# Patient Record
Sex: FEMALE | Race: WHITE | NOT HISPANIC OR LATINO
[De-identification: names, ages, dates, MRNs, and addresses within clinical notes are randomized per-mention and may not be internally consistent; named-entity substitution may affect disease eponyms.]

---

## 2017-06-01 ENCOUNTER — RESULT REVIEW (OUTPATIENT)
Age: 57
End: 2017-06-01

## 2017-08-28 ENCOUNTER — RESULT REVIEW (OUTPATIENT)
Age: 57
End: 2017-08-28

## 2018-04-10 ENCOUNTER — APPOINTMENT (OUTPATIENT)
Dept: OTOLARYNGOLOGY | Facility: CLINIC | Age: 58
End: 2018-04-10
Payer: MEDICAID

## 2018-04-10 VITALS
OXYGEN SATURATION: 97 % | HEIGHT: 65 IN | TEMPERATURE: 97.7 F | DIASTOLIC BLOOD PRESSURE: 70 MMHG | SYSTOLIC BLOOD PRESSURE: 122 MMHG | WEIGHT: 160 LBS | BODY MASS INDEX: 26.66 KG/M2 | HEART RATE: 62 BPM

## 2018-04-10 DIAGNOSIS — Z80.3 FAMILY HISTORY OF MALIGNANT NEOPLASM OF BREAST: ICD-10-CM

## 2018-04-10 DIAGNOSIS — E21.3 HYPERPARATHYROIDISM, UNSPECIFIED: ICD-10-CM

## 2018-04-10 DIAGNOSIS — Z87.09 PERSONAL HISTORY OF OTHER DISEASES OF THE RESPIRATORY SYSTEM: ICD-10-CM

## 2018-04-10 DIAGNOSIS — Z80.0 FAMILY HISTORY OF MALIGNANT NEOPLASM OF DIGESTIVE ORGANS: ICD-10-CM

## 2018-04-10 DIAGNOSIS — Z83.3 FAMILY HISTORY OF DIABETES MELLITUS: ICD-10-CM

## 2018-04-10 DIAGNOSIS — Z86.59 PERSONAL HISTORY OF OTHER MENTAL AND BEHAVIORAL DISORDERS: ICD-10-CM

## 2018-04-10 PROCEDURE — 99204 OFFICE O/P NEW MOD 45 MIN: CPT

## 2018-04-23 ENCOUNTER — APPOINTMENT (OUTPATIENT)
Dept: NUCLEAR MEDICINE | Facility: IMAGING CENTER | Age: 58
End: 2018-04-23
Payer: MEDICAID

## 2018-04-23 ENCOUNTER — OUTPATIENT (OUTPATIENT)
Dept: OUTPATIENT SERVICES | Facility: HOSPITAL | Age: 58
LOS: 1 days | End: 2018-04-23
Payer: COMMERCIAL

## 2018-04-23 DIAGNOSIS — Z00.8 ENCOUNTER FOR OTHER GENERAL EXAMINATION: ICD-10-CM

## 2018-04-23 LAB
24R-OH-CALCIDIOL SERPL-MCNC: 69.8 PG/ML
25(OH)D3 SERPL-MCNC: 28.5 NG/ML
CALCIUM SERPL-MCNC: 11 MG/DL
CALCIUM SERPL-MCNC: 11 MG/DL
PARATHYROID HORMONE INTACT: 85 PG/ML

## 2018-04-23 PROCEDURE — A9500: CPT

## 2018-04-23 PROCEDURE — 78072 PARATHYRD PLANAR W/SPECT&CT: CPT | Mod: 26

## 2018-04-23 PROCEDURE — 78072 PARATHYRD PLANAR W/SPECT&CT: CPT

## 2018-04-23 PROCEDURE — A9512: CPT

## 2018-05-01 ENCOUNTER — APPOINTMENT (OUTPATIENT)
Dept: OTOLARYNGOLOGY | Facility: CLINIC | Age: 58
End: 2018-05-01
Payer: MEDICAID

## 2018-05-01 ENCOUNTER — OUTPATIENT (OUTPATIENT)
Dept: OUTPATIENT SERVICES | Facility: HOSPITAL | Age: 58
LOS: 1 days | End: 2018-05-01
Payer: MEDICAID

## 2018-05-01 VITALS
DIASTOLIC BLOOD PRESSURE: 72 MMHG | BODY MASS INDEX: 26.63 KG/M2 | TEMPERATURE: 98 F | WEIGHT: 160 LBS | SYSTOLIC BLOOD PRESSURE: 130 MMHG | OXYGEN SATURATION: 98 %

## 2018-05-01 PROCEDURE — 99214 OFFICE O/P EST MOD 30 MIN: CPT

## 2018-05-01 PROCEDURE — G9001: CPT

## 2018-05-08 ENCOUNTER — FORM ENCOUNTER (OUTPATIENT)
Age: 58
End: 2018-05-08

## 2018-05-08 ENCOUNTER — APPOINTMENT (OUTPATIENT)
Dept: OTOLARYNGOLOGY | Facility: CLINIC | Age: 58
End: 2018-05-08

## 2018-05-09 ENCOUNTER — OUTPATIENT (OUTPATIENT)
Dept: OUTPATIENT SERVICES | Facility: HOSPITAL | Age: 58
LOS: 1 days | End: 2018-05-09
Payer: COMMERCIAL

## 2018-05-09 ENCOUNTER — APPOINTMENT (OUTPATIENT)
Dept: CT IMAGING | Facility: IMAGING CENTER | Age: 58
End: 2018-05-09
Payer: MEDICAID

## 2018-05-09 DIAGNOSIS — E21.3 HYPERPARATHYROIDISM, UNSPECIFIED: ICD-10-CM

## 2018-05-09 PROCEDURE — 70492 CT SFT TSUE NCK W/O & W/DYE: CPT

## 2018-05-09 PROCEDURE — 70491 CT SOFT TISSUE NECK W/DYE: CPT | Mod: 26

## 2018-05-15 ENCOUNTER — APPOINTMENT (OUTPATIENT)
Dept: OTOLARYNGOLOGY | Facility: CLINIC | Age: 58
End: 2018-05-15
Payer: MEDICAID

## 2018-05-15 VITALS
OXYGEN SATURATION: 97 % | DIASTOLIC BLOOD PRESSURE: 70 MMHG | WEIGHT: 160 LBS | TEMPERATURE: 97.9 F | SYSTOLIC BLOOD PRESSURE: 116 MMHG | HEART RATE: 59 BPM | BODY MASS INDEX: 26.63 KG/M2

## 2018-05-15 PROCEDURE — 31575 DIAGNOSTIC LARYNGOSCOPY: CPT

## 2018-05-15 PROCEDURE — 99214 OFFICE O/P EST MOD 30 MIN: CPT | Mod: 25

## 2018-05-16 ENCOUNTER — OUTPATIENT (OUTPATIENT)
Dept: OUTPATIENT SERVICES | Facility: HOSPITAL | Age: 58
LOS: 1 days | End: 2018-05-16

## 2018-05-16 VITALS
RESPIRATION RATE: 16 BRPM | HEIGHT: 65 IN | DIASTOLIC BLOOD PRESSURE: 80 MMHG | WEIGHT: 160.06 LBS | SYSTOLIC BLOOD PRESSURE: 130 MMHG | TEMPERATURE: 98 F | HEART RATE: 52 BPM

## 2018-05-16 DIAGNOSIS — E21.3 HYPERPARATHYROIDISM, UNSPECIFIED: ICD-10-CM

## 2018-05-16 DIAGNOSIS — F11.10 OPIOID ABUSE, UNCOMPLICATED: ICD-10-CM

## 2018-05-16 DIAGNOSIS — E03.9 HYPOTHYROIDISM, UNSPECIFIED: ICD-10-CM

## 2018-05-16 DIAGNOSIS — E78.5 HYPERLIPIDEMIA, UNSPECIFIED: ICD-10-CM

## 2018-05-16 DIAGNOSIS — N83.8 OTHER NONINFLAMMATORY DISORDERS OF OVARY, FALLOPIAN TUBE AND BROAD LIGAMENT: Chronic | ICD-10-CM

## 2018-05-16 DIAGNOSIS — F41.9 ANXIETY DISORDER, UNSPECIFIED: ICD-10-CM

## 2018-05-16 DIAGNOSIS — Z90.89 ACQUIRED ABSENCE OF OTHER ORGANS: Chronic | ICD-10-CM

## 2018-05-16 LAB
BUN SERPL-MCNC: 15 MG/DL — SIGNIFICANT CHANGE UP (ref 7–23)
CALCIUM SERPL-MCNC: 11 MG/DL — HIGH (ref 8.4–10.5)
CHLORIDE SERPL-SCNC: 97 MMOL/L — LOW (ref 98–107)
CO2 SERPL-SCNC: 28 MMOL/L — SIGNIFICANT CHANGE UP (ref 22–31)
CREAT SERPL-MCNC: 0.72 MG/DL — SIGNIFICANT CHANGE UP (ref 0.5–1.3)
GLUCOSE SERPL-MCNC: 109 MG/DL — HIGH (ref 70–99)
HCT VFR BLD CALC: 40.5 % — SIGNIFICANT CHANGE UP (ref 34.5–45)
HGB BLD-MCNC: 13.3 G/DL — SIGNIFICANT CHANGE UP (ref 11.5–15.5)
MCHC RBC-ENTMCNC: 30.3 PG — SIGNIFICANT CHANGE UP (ref 27–34)
MCHC RBC-ENTMCNC: 32.8 % — SIGNIFICANT CHANGE UP (ref 32–36)
MCV RBC AUTO: 92.3 FL — SIGNIFICANT CHANGE UP (ref 80–100)
NRBC # FLD: 0 — SIGNIFICANT CHANGE UP
PLATELET # BLD AUTO: 244 K/UL — SIGNIFICANT CHANGE UP (ref 150–400)
PMV BLD: 9 FL — SIGNIFICANT CHANGE UP (ref 7–13)
POTASSIUM SERPL-MCNC: 4.4 MMOL/L — SIGNIFICANT CHANGE UP (ref 3.5–5.3)
POTASSIUM SERPL-SCNC: 4.4 MMOL/L — SIGNIFICANT CHANGE UP (ref 3.5–5.3)
RBC # BLD: 4.39 M/UL — SIGNIFICANT CHANGE UP (ref 3.8–5.2)
RBC # FLD: 12.6 % — SIGNIFICANT CHANGE UP (ref 10.3–14.5)
SODIUM SERPL-SCNC: 138 MMOL/L — SIGNIFICANT CHANGE UP (ref 135–145)
WBC # BLD: 8.07 K/UL — SIGNIFICANT CHANGE UP (ref 3.8–10.5)
WBC # FLD AUTO: 8.07 K/UL — SIGNIFICANT CHANGE UP (ref 3.8–10.5)

## 2018-05-16 NOTE — H&P PST ADULT - PMH
Anxiety and depression    Back pain    Hyperlipidemia    Hyperparathyroidism    Hypothyroidism    Opioid abuse  Oxycodone  used [id number], taking suboxone for 5 years  Osteoarthritis    Osteopenia

## 2018-05-16 NOTE — H&P PST ADULT - HISTORY OF PRESENT ILLNESS
57 yo female with PMH hypothyroidism, osteoarthritis, hyperlipidemia, anxiety, depression, back pain, and opoid addiction on Suboxone presents to pre surgical testing. Pt reports she has been diagnosed with serum elevated Calcium level for about 10 years. Pt reports she is starting to develop osteopenia. Pt reports multiple neck sonograms done.  Pt reports CT done 1 week ago.  Pt is scheduled for right parathyroidectomy with parathyroid hormone assay on 5/23/18. 57 yo female with PMH hypothyroidism, osteoarthritis, hyperlipidemia, anxiety, depression, back pain, and opoid addiction on Suboxone presents to pre surgical testing. Pt reports she has been diagnosed with elevated Calcium level for about 10 years. Pt reports she is starting to develop osteopenia. Pt reports multiple neck sonograms done.  Pt reports CT done 1 week ago.  Pt is scheduled for right parathyroidectomy with parathyroid hormone assay on 5/23/18.

## 2018-05-16 NOTE — H&P PST ADULT - NEGATIVE ENMT SYMPTOMS
no nasal discharge/no sinus symptoms/no dysphagia/no vertigo/no nasal congestion/no ear pain/no tinnitus

## 2018-05-16 NOTE — H&P PST ADULT - NEGATIVE MUSCULOSKELETAL SYMPTOMS
no myalgia/no stiffness/no joint swelling/no muscle weakness/no leg pain L/no arthralgia/no muscle cramps/no arm pain R/no leg pain R/no arm pain L

## 2018-05-16 NOTE — H&P PST ADULT - NEGATIVE CARDIOVASCULAR SYMPTOMS
no palpitations/no peripheral edema/no paroxysmal nocturnal dyspnea/no orthopnea/no claudication/no dyspnea on exertion/no chest pain

## 2018-05-16 NOTE — H&P PST ADULT - FAMILY HISTORY
Mother  Still living? Yes, Estimated age: Age Unknown  Family history of breast cancer, Age at diagnosis: Age Unknown  Family history of thyroid cancer, Age at diagnosis: Age Unknown     Father  Still living? No  Family history of colon cancer, Age at diagnosis: Age Unknown  Family history of diabetes mellitus, Age at diagnosis: Age Unknown     Sibling  Still living? Yes, Estimated age: Age Unknown  Family history of thyroid cancer, Age at diagnosis: Age Unknown

## 2018-05-16 NOTE — H&P PST ADULT - NEGATIVE NEUROLOGICAL SYMPTOMS
no vertigo/no loss of sensation/no paresthesias/no tremors/no transient paralysis/no weakness/no generalized seizures/no difficulty walking

## 2018-05-16 NOTE — H&P PST ADULT - PROBLEM SELECTOR PLAN 1
Pt is scheduled for right parathyroidectomy with parathyroid hormone assay on 5/23/18.   Pre op instructions including chlorhexidine wash given and pt able to verbalize understanding.

## 2018-05-16 NOTE — H&P PST ADULT - PROBLEM SELECTOR PLAN 5
Spoke with Dr. Mccarthy(psychiatrist-prescriber for Suboxone) over phone, it is Dr. Mccarthy's recommendation to hold Suboxone 3 days preop, last dose 5/19.  Pt able to verbalize instructions.

## 2018-05-21 DIAGNOSIS — R69 ILLNESS, UNSPECIFIED: ICD-10-CM

## 2018-05-22 ENCOUNTER — TRANSCRIPTION ENCOUNTER (OUTPATIENT)
Age: 58
End: 2018-05-22

## 2018-05-22 NOTE — ASU PATIENT PROFILE, ADULT - PMH
Anxiety and depression    Back pain    Hyperlipidemia    Hyperparathyroidism    Hypothyroidism    Opioid abuse  Oxycodone  used [id number], taking suboxone for 5 years  Osteoarthritis    Osteopenia

## 2018-05-23 ENCOUNTER — OUTPATIENT (OUTPATIENT)
Dept: OUTPATIENT SERVICES | Facility: HOSPITAL | Age: 58
LOS: 1 days | Discharge: HOME | End: 2018-05-23
Payer: MEDICAID

## 2018-05-23 ENCOUNTER — RESULT REVIEW (OUTPATIENT)
Age: 58
End: 2018-05-23

## 2018-05-23 ENCOUNTER — APPOINTMENT (OUTPATIENT)
Dept: OTOLARYNGOLOGY | Facility: HOSPITAL | Age: 58
End: 2018-05-23

## 2018-05-23 VITALS
RESPIRATION RATE: 16 BRPM | SYSTOLIC BLOOD PRESSURE: 138 MMHG | TEMPERATURE: 98 F | OXYGEN SATURATION: 97 % | WEIGHT: 160.06 LBS | HEART RATE: 53 BPM | DIASTOLIC BLOOD PRESSURE: 78 MMHG | HEIGHT: 65 IN

## 2018-05-23 VITALS
DIASTOLIC BLOOD PRESSURE: 87 MMHG | SYSTOLIC BLOOD PRESSURE: 149 MMHG | RESPIRATION RATE: 14 BRPM | HEART RATE: 69 BPM | OXYGEN SATURATION: 100 %

## 2018-05-23 DIAGNOSIS — Z90.89 ACQUIRED ABSENCE OF OTHER ORGANS: Chronic | ICD-10-CM

## 2018-05-23 DIAGNOSIS — N83.8 OTHER NONINFLAMMATORY DISORDERS OF OVARY, FALLOPIAN TUBE AND BROAD LIGAMENT: Chronic | ICD-10-CM

## 2018-05-23 DIAGNOSIS — E21.3 HYPERPARATHYROIDISM, UNSPECIFIED: ICD-10-CM

## 2018-05-23 LAB — PTH-INTACT SERPL-MCNC: 12.51 PG/ML — LOW (ref 15–65)

## 2018-05-23 PROCEDURE — 88305 TISSUE EXAM BY PATHOLOGIST: CPT | Mod: 26

## 2018-05-23 PROCEDURE — 60500 EXPLORE PARATHYROID GLANDS: CPT

## 2018-05-23 PROCEDURE — 88331 PATH CONSLTJ SURG 1 BLK 1SPC: CPT | Mod: 26

## 2018-05-23 RX ORDER — IBUPROFEN 200 MG
1 TABLET ORAL
Qty: 0 | Refills: 0 | COMMUNITY

## 2018-05-23 RX ORDER — CALCIUM CARBONATE 500(1250)
3 TABLET ORAL
Qty: 270 | Refills: 0
Start: 2018-05-23 | End: 2018-06-21

## 2018-05-23 RX ORDER — CALCITRIOL 0.5 UG/1
1 CAPSULE ORAL
Qty: 60 | Refills: 0
Start: 2018-05-23 | End: 2018-06-21

## 2018-05-23 NOTE — ASU DISCHARGE PLAN (ADULT/PEDIATRIC). - MEDICATION SUMMARY - MEDICATIONS TO STOP TAKING
I will STOP taking the medications listed below when I get home from the hospital:    Advil 200 mg oral tablet  -- 1 tab(s) by mouth every 6 hours. Last dose 5/16/18

## 2018-05-23 NOTE — ASU DISCHARGE PLAN (ADULT/PEDIATRIC). - NOTIFY
Pain not relieved by Medications/Persistent Nausea and Vomiting/Unable to Urinate/Bleeding that does not stop/Numbness, tingling/Swelling that continues/Fever greater than 101

## 2018-05-23 NOTE — ASU DISCHARGE PLAN (ADULT/PEDIATRIC). - MEDICATION SUMMARY - MEDICATIONS TO TAKE
I will START or STAY ON the medications listed below when I get home from the hospital:    Suboxone 4 mg-1 mg sublingual film  -- 1 film(s) under tongue once a day AM  -- Indication: For Home    PROzac 40 mg oral capsule  -- 1 cap(s) by mouth once a day  -- Indication: For Home    pravastatin 10 mg oral tablet  -- 1 tab(s) by mouth once a day  -- Indication: For Home    Fish Oil oral capsule  -- 1 cap(s) by mouth once a day. last dose 5/15/18  -- Indication: For Home    CoQ10 300 mg oral capsule  -- 1 cap(s) by mouth once a day. Last dose 5/15/18  -- Indication: For Home    levothyroxine 88 mcg (0.088 mg) oral tablet  -- 1 tab(s) by mouth once a day  -- Indication: For Home I will START or STAY ON the medications listed below when I get home from the hospital:    Percocet 5/325 oral tablet  -- 1 tab(s) by mouth every 6 hours, As Needed MDD:4-6 for pain  -- Caution federal law prohibits the transfer of this drug to any person other  than the person for whom it was prescribed.  May cause drowsiness.  Alcohol may intensify this effect.  Use care when operating dangerous machinery.  This prescription cannot be refilled.  This product contains acetaminophen.  Do not use  with any other product containing acetaminophen to prevent possible liver damage.  Using more of this medication than prescribed may cause serious breathing problems.    -- Indication: For pain med    Suboxone 4 mg-1 mg sublingual film  -- 1 film(s) under tongue once a day AM  -- Indication: For Home    PROzac 40 mg oral capsule  -- 1 cap(s) by mouth once a day  -- Indication: For Home    pravastatin 10 mg oral tablet  -- 1 tab(s) by mouth once a day  -- Indication: For Home    Fish Oil oral capsule  -- 1 cap(s) by mouth once a day. last dose 5/15/18  -- Indication: For Home    CoQ10 300 mg oral capsule  -- 1 cap(s) by mouth once a day. Last dose 5/15/18  -- Indication: For Home    levothyroxine 88 mcg (0.088 mg) oral tablet  -- 1 tab(s) by mouth once a day  -- Indication: For Home I will START or STAY ON the medications listed below when I get home from the hospital:    Percocet 5/325 oral tablet  -- 1 tab(s) by mouth every 6 hours, As Needed MDD:4-6 for pain  -- Caution federal law prohibits the transfer of this drug to any person other  than the person for whom it was prescribed.  May cause drowsiness.  Alcohol may intensify this effect.  Use care when operating dangerous machinery.  This prescription cannot be refilled.  This product contains acetaminophen.  Do not use  with any other product containing acetaminophen to prevent possible liver damage.  Using more of this medication than prescribed may cause serious breathing problems.    -- Indication: For pain    Suboxone 4 mg-1 mg sublingual film  -- 1 film(s) under tongue once a day AM  -- Indication: For Home    PROzac 40 mg oral capsule  -- 1 cap(s) by mouth once a day  -- Indication: For Home    pravastatin 10 mg oral tablet  -- 1 tab(s) by mouth once a day  -- Indication: For Home    Fish Oil oral capsule  -- 1 cap(s) by mouth once a day. last dose 5/15/18  -- Indication: For Home    CoQ10 300 mg oral capsule  -- 1 cap(s) by mouth once a day. Last dose 5/15/18  -- Indication: For Home    levothyroxine 88 mcg (0.088 mg) oral tablet  -- 1 tab(s) by mouth once a day  -- Indication: For Home I will START or STAY ON the medications listed below when I get home from the hospital:    Percocet 5/325 oral tablet  -- 1 tab(s) by mouth every 6 hours, As Needed MDD:4-6 for pain  -- Caution federal law prohibits the transfer of this drug to any person other  than the person for whom it was prescribed.  May cause drowsiness.  Alcohol may intensify this effect.  Use care when operating dangerous machinery.  This prescription cannot be refilled.  This product contains acetaminophen.  Do not use  with any other product containing acetaminophen to prevent possible liver damage.  Using more of this medication than prescribed may cause serious breathing problems.    -- Indication: For pain    Suboxone 4 mg-1 mg sublingual film  -- 1 film(s) under tongue once a day AM  -- Indication: For Home    calcium carbonate 500 mg (200 mg elemental calcium) oral tablet, chewable  -- 3 tab(s) chewed 3 times a day   -- Chew tablets before swallowing    -- Indication: For calcium supplements    PROzac 40 mg oral capsule  -- 1 cap(s) by mouth once a day  -- Indication: For Home    pravastatin 10 mg oral tablet  -- 1 tab(s) by mouth once a day  -- Indication: For Home    Fish Oil oral capsule  -- 1 cap(s) by mouth once a day. last dose 5/15/18  -- Indication: For Home    CoQ10 300 mg oral capsule  -- 1 cap(s) by mouth once a day. Last dose 5/15/18  -- Indication: For Home    levothyroxine 88 mcg (0.088 mg) oral tablet  -- 1 tab(s) by mouth once a day  -- Indication: For Home    calcitriol 0.5 mcg oral capsule  -- 1 cap(s) by mouth 2 times a day   -- It is very important that you take or use this exactly as directed.  Do not skip doses or discontinue unless directed by your doctor.  Obtain medical advice before taking any non-prescription drugs as some may affect the action of this medication.    -- Indication: For calcium supplements

## 2018-06-07 ENCOUNTER — APPOINTMENT (OUTPATIENT)
Dept: OTOLARYNGOLOGY | Facility: CLINIC | Age: 58
End: 2018-06-07
Payer: MEDICAID

## 2018-06-07 VITALS
BODY MASS INDEX: 26.66 KG/M2 | DIASTOLIC BLOOD PRESSURE: 120 MMHG | WEIGHT: 160 LBS | HEART RATE: 75 BPM | HEIGHT: 65 IN | SYSTOLIC BLOOD PRESSURE: 153 MMHG

## 2018-06-07 PROCEDURE — 99024 POSTOP FOLLOW-UP VISIT: CPT

## 2018-06-07 RX ORDER — B-COMPLEX WITH VITAMIN C
1250 (500 CA) TABLET ORAL
Qty: 90 | Refills: 3 | Status: ACTIVE | COMMUNITY
Start: 2018-06-07 | End: 1900-01-01

## 2018-06-15 LAB
24R-OH-CALCIDIOL SERPL-MCNC: 54.2 PG/ML
CALCIUM SERPL-MCNC: 9.9 MG/DL
CALCIUM SERPL-MCNC: 9.9 MG/DL
PARATHYROID HORMONE INTACT: 18 PG/ML

## 2018-09-20 ENCOUNTER — APPOINTMENT (OUTPATIENT)
Dept: OTOLARYNGOLOGY | Facility: CLINIC | Age: 58
End: 2018-09-20

## 2019-07-29 ENCOUNTER — RESULT REVIEW (OUTPATIENT)
Age: 59
End: 2019-07-29

## 2019-08-09 PROBLEM — M19.90 UNSPECIFIED OSTEOARTHRITIS, UNSPECIFIED SITE: Chronic | Status: ACTIVE | Noted: 2018-05-16

## 2019-08-09 PROBLEM — M54.9 DORSALGIA, UNSPECIFIED: Chronic | Status: ACTIVE | Noted: 2018-05-16

## 2019-08-09 PROBLEM — E21.3 HYPERPARATHYROIDISM, UNSPECIFIED: Chronic | Status: ACTIVE | Noted: 2018-05-16

## 2019-08-09 PROBLEM — E78.5 HYPERLIPIDEMIA, UNSPECIFIED: Chronic | Status: ACTIVE | Noted: 2018-05-16

## 2019-08-09 PROBLEM — E03.9 HYPOTHYROIDISM, UNSPECIFIED: Chronic | Status: ACTIVE | Noted: 2018-05-16

## 2019-08-09 PROBLEM — F11.10 OPIOID ABUSE, UNCOMPLICATED: Chronic | Status: ACTIVE | Noted: 2018-05-16

## 2019-08-09 PROBLEM — M85.80 OTHER SPECIFIED DISORDERS OF BONE DENSITY AND STRUCTURE, UNSPECIFIED SITE: Chronic | Status: ACTIVE | Noted: 2018-05-16

## 2019-08-09 PROBLEM — F41.9 ANXIETY DISORDER, UNSPECIFIED: Chronic | Status: ACTIVE | Noted: 2018-05-16

## 2019-08-15 ENCOUNTER — APPOINTMENT (OUTPATIENT)
Dept: SURGERY | Facility: CLINIC | Age: 59
End: 2019-08-15
Payer: MEDICAID

## 2019-08-15 VITALS
HEIGHT: 65 IN | WEIGHT: 168 LBS | BODY MASS INDEX: 27.99 KG/M2 | TEMPERATURE: 98.2 F | DIASTOLIC BLOOD PRESSURE: 84 MMHG | HEART RATE: 71 BPM | SYSTOLIC BLOOD PRESSURE: 145 MMHG

## 2019-08-15 DIAGNOSIS — D05.12 INTRADUCTAL CARCINOMA IN SITU OF LEFT BREAST: ICD-10-CM

## 2019-08-15 DIAGNOSIS — Z87.09 PERSONAL HISTORY OF OTHER DISEASES OF THE RESPIRATORY SYSTEM: ICD-10-CM

## 2019-08-15 DIAGNOSIS — Z87.891 PERSONAL HISTORY OF NICOTINE DEPENDENCE: ICD-10-CM

## 2019-08-15 DIAGNOSIS — Z80.8 FAMILY HISTORY OF MALIGNANT NEOPLASM OF OTHER ORGANS OR SYSTEMS: ICD-10-CM

## 2019-08-15 DIAGNOSIS — Z86.59 PERSONAL HISTORY OF OTHER MENTAL AND BEHAVIORAL DISORDERS: ICD-10-CM

## 2019-08-15 DIAGNOSIS — Z85.3 PERSONAL HISTORY OF MALIGNANT NEOPLASM OF BREAST: ICD-10-CM

## 2019-08-15 DIAGNOSIS — Z87.39 PERSONAL HISTORY OF OTHER DISEASES OF THE MUSCULOSKELETAL SYSTEM AND CONNECTIVE TISSUE: ICD-10-CM

## 2019-08-15 DIAGNOSIS — K70.30 ALCOHOLIC CIRRHOSIS OF LIVER W/OUT ASCITES: ICD-10-CM

## 2019-08-15 DIAGNOSIS — Z80.3 FAMILY HISTORY OF MALIGNANT NEOPLASM OF BREAST: ICD-10-CM

## 2019-08-15 DIAGNOSIS — Z86.39 PERSONAL HISTORY OF OTHER ENDOCRINE, NUTRITIONAL AND METABOLIC DISEASE: ICD-10-CM

## 2019-08-15 DIAGNOSIS — Z82.49 FAMILY HISTORY OF ISCHEMIC HEART DISEASE AND OTHER DISEASES OF THE CIRCULATORY SYSTEM: ICD-10-CM

## 2019-08-15 PROCEDURE — 99204 OFFICE O/P NEW MOD 45 MIN: CPT

## 2019-08-15 RX ORDER — LEVOTHYROXINE SODIUM 88 UG/1
88 TABLET ORAL
Refills: 0 | Status: ACTIVE | COMMUNITY

## 2019-08-15 RX ORDER — PRAVASTATIN SODIUM 80 MG/1
TABLET ORAL
Refills: 0 | Status: ACTIVE | COMMUNITY

## 2019-08-15 RX ORDER — FLUOXETINE HCL 10 MG
TABLET ORAL
Refills: 0 | Status: ACTIVE | COMMUNITY

## 2019-08-15 RX ORDER — BUPRENORPHINE HCL/NALOXONE HCL 8 MG-2 MG
8-2 TABLET, SUBLINGUAL SUBLINGUAL
Refills: 0 | Status: ACTIVE | COMMUNITY

## 2019-08-15 NOTE — ASSESSMENT
[FreeTextEntry1] : 59 y.o F with recently diagnosed L. breast CA, she has DCIS with microcalcifications present, grade 2 atypia.

## 2019-08-15 NOTE — PHYSICAL EXAM
[Normal Breath Sounds] : Normal breath sounds [Normal Rate and Rhythm] : normal rate and rhythm [No Rash or Lesion] : No rash or lesion [Alert] : alert [Oriented to Person] : oriented to person [Oriented to Place] : oriented to place [Oriented to Time] : oriented to time [Calm] : calm [de-identified] : A/Ox3; NAD [de-identified] : EOMI, sclera anicteric  [de-identified] : post biopsy changes noted to L. breast, 4:00 near nipple areolar complex; no masses or lumps felt to either breast, no nipple discharge, no skin thickening, no chest deformity, no axillary adenopathy.  [de-identified] : supple, no JVD [de-identified] : abd is soft, NT/ND [de-identified] : no LE edema

## 2019-08-15 NOTE — CONSULT LETTER
[Dear  ___] : Dear  [unfilled], [Consult Letter:] : I had the pleasure of evaluating your patient, [unfilled]. [Consult Closing:] : Thank you very much for allowing me to participate in the care of this patient.  If you have any questions, please do not hesitate to contact me. [Sincerely,] : Sincerely, [FreeTextEntry3] : Jim Parham MD\par

## 2019-08-15 NOTE — HISTORY OF PRESENT ILLNESS
[de-identified] : Patient is a 59 y.o F referred for consultation visit, she has L. breast CA. Patient had a screening mammogram, 05/25/19: Results showed anterior third of the left lower inner breast, approximately 2 to 3 cm from the nipple, grouped calcifications, increasing in number over time. There are stable diffuse right breast calcifications. Unilateral call back L. breast mammo and b/l breast US was done 06/19/19;  group of suspicious microcalcifications seen at about 6:00 left breast. BI RADS cat 4 finding. \par L. Breast Stereo Core guided BX, 07/29/19; Findings were consistent with ductal carcinoma in situ as well as fibrocystic changes. The patient is ER/VT positive. \par Patient had parathyroid removed 2/2 CA; has hx of ectopic pregnancy. She denies any personal hx of breast CA, no previous breast biopsies. \par \par Fam HX: Brother- Thyroid CA, had thyroidectomy\par Mother- Breast CA (Age 60s) ; Thyroid CA

## 2019-08-15 NOTE — REVIEW OF SYSTEMS
[Fever] : no fever [Chills] : no chills [Sore Throat] : no sore throat [Chest Pain] : no chest pain [Lower Ext Edema] : no lower extremity edema [Shortness Of Breath] : no shortness of breath [Wheezing] : no wheezing [Abdominal Pain] : no abdominal pain [Pelvic Pain] : no pelvic pain [Joint Swelling] : no joint swelling [Joint Stiffness] : no joint stiffness [Skin Lesions] : no skin lesions [Skin Wound] : no skin wound [Dizziness] : no dizziness [Anxiety] : no anxiety [Muscle Weakness] : no muscle weakness [Swollen Glands] : no swollen glands [de-identified] : denies feeling any breast pain or lumps to either side

## 2019-08-15 NOTE — PLAN
[FreeTextEntry1] : Had  a long d/w the patient. She has newly diagnosed breast cancer- DCIS, Left Breast. Preoperative needle localization and Lumpectomy followed by radiation therapy versus mastectomy with or without reconstruction were discussed. The potential complications including infection, bleeding, upper extremity swelling, recurrence and other complications were also discussed. All the questions were answered to her satisfaction. \par She would need needle localization and excision of the L. breast lesion. All the options, benefits and risks were discussed. The small potential for infection, bleeding and not getting the clip were discussed. Will schedule at [facility]. \par The potential for invasive breast cancer was also discussed as well as the margins which may results in further surgery\par \par

## 2019-08-27 ENCOUNTER — OUTPATIENT (OUTPATIENT)
Dept: OUTPATIENT SERVICES | Facility: HOSPITAL | Age: 59
LOS: 1 days | End: 2019-08-27
Payer: MEDICAID

## 2019-08-27 VITALS
HEART RATE: 59 BPM | WEIGHT: 164.91 LBS | HEIGHT: 65 IN | DIASTOLIC BLOOD PRESSURE: 89 MMHG | OXYGEN SATURATION: 100 % | RESPIRATION RATE: 18 BRPM | TEMPERATURE: 97 F | SYSTOLIC BLOOD PRESSURE: 138 MMHG

## 2019-08-27 DIAGNOSIS — Z01.818 ENCOUNTER FOR OTHER PREPROCEDURAL EXAMINATION: ICD-10-CM

## 2019-08-27 DIAGNOSIS — N83.8 OTHER NONINFLAMMATORY DISORDERS OF OVARY, FALLOPIAN TUBE AND BROAD LIGAMENT: Chronic | ICD-10-CM

## 2019-08-27 DIAGNOSIS — D05.12 INTRADUCTAL CARCINOMA IN SITU OF LEFT BREAST: ICD-10-CM

## 2019-08-27 DIAGNOSIS — Z90.89 ACQUIRED ABSENCE OF OTHER ORGANS: Chronic | ICD-10-CM

## 2019-08-27 PROCEDURE — G0463: CPT

## 2019-08-27 RX ORDER — FLUOXETINE HCL 10 MG
1 CAPSULE ORAL
Qty: 0 | Refills: 0 | DISCHARGE

## 2019-08-27 RX ORDER — OMEGA-3 ACID ETHYL ESTERS 1 G
1 CAPSULE ORAL
Qty: 0 | Refills: 0 | DISCHARGE

## 2019-08-27 RX ORDER — UBIDECARENONE 100 MG
1 CAPSULE ORAL
Qty: 0 | Refills: 0 | DISCHARGE

## 2019-08-27 RX ORDER — LEVOTHYROXINE SODIUM 125 MCG
1 TABLET ORAL
Qty: 0 | Refills: 0 | DISCHARGE

## 2019-08-27 RX ORDER — BUPRENORPHINE AND NALOXONE 2; .5 MG/1; MG/1
1 TABLET SUBLINGUAL
Qty: 0 | Refills: 0 | DISCHARGE

## 2019-08-27 NOTE — H&P PST ADULT - NSICDXPASTMEDICALHX_GEN_ALL_CORE_FT
PAST MEDICAL HISTORY:  Anxiety and depression     Back pain     Hyperlipidemia     Hyperparathyroidism     Hypothyroidism     Opioid abuse Oxycodone  used [id number], taking suboxone for 5 years    Osteoarthritis     Osteopenia PAST MEDICAL HISTORY:  Anxiety and depression     Back pain     Hyperlipidemia     Hyperparathyroidism     Hypothyroidism     Intraductal carcinoma of left breast     Opioid abuse Oxycodone  used [id number], taking suboxone for 5 years    Osteoarthritis     Osteopenia PAST MEDICAL HISTORY:  Anxiety and depression     Back pain     Chronic obstructive pulmonary disease (COPD) no inhalers    Hepatitis C treated with Interferon 10 years ago    Hyperlipidemia     Hyperparathyroidism     Hypothyroidism     Intraductal carcinoma of left breast     Opioid abuse Oxycodone  used [id number], taking suboxone for 5 years    Osteoarthritis     Osteopenia

## 2019-08-27 NOTE — H&P PST ADULT - ASSESSMENT
59 yr old female with PMH of anxiety, depression, hypothyroidism, opioid dependence on Buprenorphine-naloxone sublingual daily, COPD-no inhalers, hepatitis C treated 10 years ago presents with intraductal carcinoma of left breast. Pt is scheduled for left breast lumpectomy with preoperative needle localization on 9/4/2019.

## 2019-08-27 NOTE — H&P PST ADULT - NEGATIVE BREAST SYMPTOMS
no nipple discharge R/no breast lump R/no nipple discharge L/no breast tenderness L/no breast tenderness R

## 2019-08-27 NOTE — H&P PST ADULT - NSICDXPROBLEM_GEN_ALL_CORE_FT
PROBLEM DIAGNOSES  Problem: Opioid dependence  Assessment and Plan: Pt on Buprenorphine-naloxone due to opioid dependence. Discussed with Dr Mcleod. Instructed pt to stop meds 3 days before surgery as per Dr Mcleod. Stated understanding.    Problem: Anxiety and depression  Assessment and Plan: Continue meds and take Prozac with sips of water on day of surgery. Follow-up with Provider for management.    Problem: Hypothyroidism  Assessment and Plan: Continue Levothyroxine and take with sips of water on day of surgery. Follow-up with PCP postop for thyroid management.

## 2019-08-27 NOTE — H&P PST ADULT - NEGATIVE GASTROINTESTINAL SYMPTOMS
no nausea/no change in bowel habits/no melena/no constipation/no abdominal pain/no diarrhea/no flatulence/no vomiting

## 2019-08-27 NOTE — H&P PST ADULT - HISTORY OF PRESENT ILLNESS
59 yr old female with PMH of anxiety, depression, hypothyroidism, opioid dependence on Buprenorphine-naloxone sublingual daily, COPD-no inhalers, hepatitis C treated 10 years ago presents with c/o abnormal mammogram. Biopsy revealed intraductal carcinoma. Pt for left breast lumpectomy with preoperative needle localization on 9/4/2019.

## 2019-08-27 NOTE — H&P PST ADULT - NSICDXPASTSURGICALHX_GEN_ALL_CORE_FT
PAST SURGICAL HISTORY:  Rupture, fallopian tube left, 1983    S/P tonsillectomy 1960s PAST SURGICAL HISTORY:  H/O left breast biopsy stereotactic on 7/29/2019    History of parathyroidectomy right inferior on 5/14/2018    Rupture, fallopian tube left, 1983    S/P tonsillectomy 1960s

## 2019-08-27 NOTE — H&P PST ADULT - NSICDXFAMILYHX_GEN_ALL_CORE_FT
FAMILY HISTORY:  Father  Still living? No  Family history of colon cancer, Age at diagnosis: Age Unknown  Family history of diabetes mellitus, Age at diagnosis: Age Unknown    Mother  Still living? Yes, Estimated age: Age Unknown  Family history of breast cancer, Age at diagnosis: Age Unknown  Family history of thyroid cancer, Age at diagnosis: Age Unknown    Sibling  Still living? Yes, Estimated age: Age Unknown  Family history of thyroid cancer, Age at diagnosis: Age Unknown FAMILY HISTORY:  Family history of brain cancer, grandmother  Family history of lung cancer, maternal grandmother  Family history of rectal cancer, father  Family history of skin cancer, father    Father  Still living? No  Family history of colon cancer, Age at diagnosis: Age Unknown  Family history of diabetes mellitus, Age at diagnosis: Age Unknown    Mother  Still living? Yes, Estimated age: Age Unknown  Family history of breast cancer, Age at diagnosis: Age Unknown  Family history of thyroid cancer, Age at diagnosis: Age Unknown    Sibling  Still living? Yes, Estimated age: Age Unknown  Family history of thyroid cancer, Age at diagnosis: Age Unknown

## 2019-08-30 DIAGNOSIS — F41.9 ANXIETY DISORDER, UNSPECIFIED: ICD-10-CM

## 2019-08-30 DIAGNOSIS — Z29.9 ENCOUNTER FOR PROPHYLACTIC MEASURES, UNSPECIFIED: ICD-10-CM

## 2019-08-30 DIAGNOSIS — E03.9 HYPOTHYROIDISM, UNSPECIFIED: ICD-10-CM

## 2019-08-30 DIAGNOSIS — F11.20 OPIOID DEPENDENCE, UNCOMPLICATED: ICD-10-CM

## 2019-08-30 RX ORDER — SODIUM CHLORIDE 9 MG/ML
3 INJECTION INTRAMUSCULAR; INTRAVENOUS; SUBCUTANEOUS EVERY 8 HOURS
Refills: 0 | Status: DISCONTINUED | OUTPATIENT
Start: 2019-09-04 | End: 2019-09-14

## 2019-09-03 ENCOUNTER — FORM ENCOUNTER (OUTPATIENT)
Age: 59
End: 2019-09-03

## 2019-09-04 ENCOUNTER — APPOINTMENT (OUTPATIENT)
Dept: SURGERY | Facility: HOSPITAL | Age: 59
End: 2019-09-04

## 2019-09-04 ENCOUNTER — OUTPATIENT (OUTPATIENT)
Dept: OUTPATIENT SERVICES | Facility: HOSPITAL | Age: 59
LOS: 1 days | End: 2019-09-04
Payer: MEDICAID

## 2019-09-04 ENCOUNTER — RESULT REVIEW (OUTPATIENT)
Age: 59
End: 2019-09-04

## 2019-09-04 VITALS
SYSTOLIC BLOOD PRESSURE: 132 MMHG | DIASTOLIC BLOOD PRESSURE: 67 MMHG | HEART RATE: 69 BPM | WEIGHT: 164.91 LBS | OXYGEN SATURATION: 97 % | RESPIRATION RATE: 16 BRPM | HEIGHT: 65 IN | TEMPERATURE: 97 F

## 2019-09-04 VITALS
HEART RATE: 64 BPM | OXYGEN SATURATION: 95 % | RESPIRATION RATE: 16 BRPM | TEMPERATURE: 98 F | SYSTOLIC BLOOD PRESSURE: 123 MMHG | DIASTOLIC BLOOD PRESSURE: 73 MMHG

## 2019-09-04 DIAGNOSIS — N83.8 OTHER NONINFLAMMATORY DISORDERS OF OVARY, FALLOPIAN TUBE AND BROAD LIGAMENT: Chronic | ICD-10-CM

## 2019-09-04 DIAGNOSIS — J44.9 CHRONIC OBSTRUCTIVE PULMONARY DISEASE, UNSPECIFIED: ICD-10-CM

## 2019-09-04 DIAGNOSIS — Z90.09 ACQUIRED ABSENCE OF OTHER PART OF HEAD AND NECK: Chronic | ICD-10-CM

## 2019-09-04 DIAGNOSIS — Z98.890 OTHER SPECIFIED POSTPROCEDURAL STATES: Chronic | ICD-10-CM

## 2019-09-04 DIAGNOSIS — Z90.89 ACQUIRED ABSENCE OF OTHER ORGANS: Chronic | ICD-10-CM

## 2019-09-04 DIAGNOSIS — D05.12 INTRADUCTAL CARCINOMA IN SITU OF LEFT BREAST: ICD-10-CM

## 2019-09-04 PROCEDURE — 88307 TISSUE EXAM BY PATHOLOGIST: CPT | Mod: 26

## 2019-09-04 PROCEDURE — 88307 TISSUE EXAM BY PATHOLOGIST: CPT

## 2019-09-04 PROCEDURE — 19281 PERQ DEVICE BREAST 1ST IMAG: CPT | Mod: LT

## 2019-09-04 PROCEDURE — 19301 PARTIAL MASTECTOMY: CPT | Mod: LT

## 2019-09-04 PROCEDURE — 76098 X-RAY EXAM SURGICAL SPECIMEN: CPT | Mod: 26

## 2019-09-04 PROCEDURE — 19281 PERQ DEVICE BREAST 1ST IMAG: CPT

## 2019-09-04 PROCEDURE — 76098 X-RAY EXAM SURGICAL SPECIMEN: CPT

## 2019-09-04 RX ORDER — ACETAMINOPHEN 500 MG
650 TABLET ORAL EVERY 6 HOURS
Refills: 0 | Status: DISCONTINUED | OUTPATIENT
Start: 2019-09-04 | End: 2019-09-14

## 2019-09-04 RX ORDER — SODIUM CHLORIDE 9 MG/ML
1000 INJECTION, SOLUTION INTRAVENOUS
Refills: 0 | Status: DISCONTINUED | OUTPATIENT
Start: 2019-09-04 | End: 2019-09-04

## 2019-09-04 RX ORDER — ACETAMINOPHEN 500 MG
1000 TABLET ORAL ONCE
Refills: 0 | Status: COMPLETED | OUTPATIENT
Start: 2019-09-04 | End: 2019-09-04

## 2019-09-04 RX ORDER — OXYCODONE AND ACETAMINOPHEN 5; 325 MG/1; MG/1
1 TABLET ORAL EVERY 4 HOURS
Refills: 0 | Status: DISCONTINUED | OUTPATIENT
Start: 2019-09-04 | End: 2019-09-04

## 2019-09-04 RX ORDER — ACETAMINOPHEN 500 MG
2 TABLET ORAL
Qty: 0 | Refills: 0 | DISCHARGE

## 2019-09-04 RX ORDER — IBUPROFEN 200 MG
600 TABLET ORAL EVERY 6 HOURS
Refills: 0 | Status: DISCONTINUED | OUTPATIENT
Start: 2019-09-04 | End: 2019-09-14

## 2019-09-04 RX ADMIN — Medication 400 MILLIGRAM(S): at 14:35

## 2019-09-04 RX ADMIN — Medication 1000 MILLIGRAM(S): at 14:50

## 2019-09-04 RX ADMIN — SODIUM CHLORIDE 3 MILLILITER(S): 9 INJECTION INTRAMUSCULAR; INTRAVENOUS; SUBCUTANEOUS at 08:02

## 2019-09-04 NOTE — ASU DISCHARGE PLAN (ADULT/PEDIATRIC) - CALL YOUR DOCTOR IF YOU HAVE ANY OF THE FOLLOWING:
Pain not relieved by Medications/Nausea and vomiting that does not stop/Bleeding that does not stop/Numbness, tingling, color or temperature change to extremity/Swelling that gets worse/Fever greater than (need to indicate Fahrenheit or Celsius)/Wound/Surgical Site with redness, or foul smelling discharge or pus

## 2019-09-04 NOTE — ASU DISCHARGE PLAN (ADULT/PEDIATRIC) - CARE PROVIDER_API CALL
Jim Parham (MD)  Surgery  [address], [place] [id number]  Phone: [phone]  Fax: [phone]  Follow Up Time:

## 2019-09-04 NOTE — ASU PATIENT PROFILE, ADULT - TEACHING/LEARNING DEVELOPMENTAL CONSIDERATIONS
Problem: Adult Inpatient Plan of Care  Goal: Patient-Specific Goal (Individualization)  Plan of care discussed with patient. Patient is free of fall/trauma/injury. Denies CP, SOB, or pain/discomfort. All questions addressed. Will continue to monitor       none

## 2019-09-04 NOTE — ASU DISCHARGE PLAN (ADULT/PEDIATRIC) - NURSING INSTRUCTIONS
Keep dressing dry, clean, intact for 2 days. Do not get incision wet for 48 hours (2 days). After 2 days, remove dressing (tape and gauze) and leave steri strips (white strips) on. You can shower with steri strips on. The steri strips will fall off during shower. Do not rub the steri strips off. Pat dry after shower. It sometimes take 2-3 days for them to fall off.

## 2019-09-04 NOTE — ASU PATIENT PROFILE, ADULT - NS SC CAGE ALCOHOL GUILTY ABOUT
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 He was initially admitted with aortic valve disease and ascending aortic aneurysm with plan for surgical intervention with Dr. Wiseman. He underwent Aortic valve replacement, replacement ascending aorta with 34 mm Hemashield Platinum graft and removal left atrial appendage on 2019. He again began to complain of dysphagia post operatively. GI was consulted and he underwent EGD on 3/3/19 which revealed showed food filled esophagus without noted peristalsis throughout the prolonged procedure. Hyperemic area of the distal esophagus which was biopsied, cytology revealing high grade glandular dysplasia; suspicious for intramucosal adenocarcinoma. EUS was completed on 3/8/19 results are as follows: Distal esophageal nodular mass with an EUS stage of at least T1b (possible T2) Nx MX. The distal paraesophageal lymph node was located posterior to the mass and could not be sampled. However the EUS features other than the size were concerning for possible involvement. Final cytology pending. Consultation with thoracic surgery is requested.       Assessment/Plan:  Esophageal mass: EGD and EUS findings as above. Final pathology positive for adenocarincoma. Plan to obtain CT chest / abd / pelvis reviewed. No evidence of metastatic disease. Will need PET CT. Will schedule as outpatient.  Dysphagia: Related to above. Diet pet GI - on full liquid diet. Calorie count  sufficient.    Pericardial effusion S/p subxiphoid pericardial window: Management per CV surgery.  S/p AVR, Replacement ascending aorta, removal of DEMETRICE: Management per CV surgery.   Pulmonary embolism:  OAC / Heparin per CV surg.   Deconditioning: Improving.     **PET CT Scheduled for 3/27/19 with plan to see patient in clinic on 3/28/19 to review.       Subjective: Denies complaints.     Vital 24 Hour Range Last Value   Temperature Temp  Min: 97.4 °F (36.3 °C)  Max: 98.2 °F (36.8 °C) 97.4 °F (36.3 °C) (03/21/19 1530)   Pulse Pulse  Min: 94  Max: 110 94 (03/21/19 1534)   Respiratory Resp  Min: 16  Max: 18 18 (03/21/19 1534)   Non-Invasive  Blood Pressure BP  Min: 121/59  Max: 138/64 121/59 (03/21/19 1534)   Pulse Oximetry SpO2  Min: 94 %  Max: 97 % 95 % (03/21/19 1534)     Oxygen: RA    Weight over the past 48 Hours:  Patient Vitals for the past 48 hrs:   Weight   03/20/19 0604 123.5 kg      Admit Weight:   Weight: 133.7 kg (02/27/19 0515)  BMI:   BMI (Calculated): 36.83 (02/27/19 0515)    Intake/Output:    Last Stool Occurrence: 1(small soft stool) (03/18/19 1923)  I/O this shift:  In: 200 [P.O.:200]  Out: 450 [Urine:450]  I/O last 3 completed shifts:  In: 814.4 [P.O.:440; I.V.:374.4]  Out: 1700 [Urine:1700]      Intake/Output Summary (Last 24 hours) at 3/21/2019 1614  Last data filed at 3/21/2019 1537  Gross per 24 hour   Intake 1014.4 ml   Output 2150 ml   Net -1135.6 ml       Physical Exam:   Heart: Regular rate and rhythm and S1, S2 normal  Lungs: Diminished breath sounds  bibasilar  Abdomen: Soft, non tender, +BS, +BM  Neurologic: Grossly normal, Alert and oriented to person, place and time and Hand grasp equal bilaterally    Laboratory Results:    Recent Labs   Lab 03/21/19  0549 03/20/19  1805 03/20/19  1212 03/20/19  0609 03/19/19  2235  03/19/19  0405  03/17/19  0435  03/16/19  0350  03/15/19  0420 03/14/19  1930   SODIUM  --   --   --   --   --   --   --   --  139  --  140  --  139  --    POTASSIUM  --   4.0 3.8 3.8  --    < >  --    < > 3.5  --  4.2   < > 3.7 3.6   CHLORIDE  --   --   --   --   --   --   --   --  101  --  105  --  103  --    CO2  --   --   --   --   --   --   --   --  30  --  28  --  30  --    BUN  --   --   --   --   --   --   --   --  16  --  17  --  14  --    CREATININE  --   --   --   --   --   --   --   --  1.17  --  1.16  --  1.19*  --    GLUCOSE  --   --   --   --   --   --   --   --  106*  --  118*  --  97  --    MG  --   --   --   --   --   --   --   --   --   --  1.8  --   --  1.8   WBC  --   --   --   --   --   --   --   --  5.7  --  7.2  --  5.8  --    HGB  --   --   --   --   --   --   --   --  7.9*  --  7.9*  --  7.8*  --    HCT  --   --   --   --   --   --   --   --  26.0*  --  25.6*  --  24.7*  --    PLT  --   --   --   --   --   --   --   --  235  --  255  --  256  --    PT 13.9*  --   --  14.1*  --   --  13.4*   < >  --   --   --   --   --  13.1*   INR 1.4  --   --  1.4  --   --  1.3   < >  --   --   --   --   --  1.3   PTT 51*  --   --  47* 43*   < > 45*   < > 46*   < >  --   --   --   --     < > = values in this interval not displayed.     CT CHEST / ABD / PELVIS:   1.  No evidence of metastatic disease within the chest abdomen or pelvis. The esophageal mass is not well visualized on CT.  2.  Bilateral pulmonary emboli with a subjective moderate embolic burden. No evidence of right heart strain.  3.  Moderate sized pericardial effusion which was not present on intraoperative ROSE report 2/27/2019 and is assumed new from that date.  4.  Small right and trace left pleural effusions.  5.  Severe uncomplicated colonic diverticulosis.  6.  Bilateral nonobstructing renal stones measuring up to 4 mm on the left and multiple prominent stones on the right measuring up to 14 mm.    ECHO 3/14/2019  Moderate circumferential pericardial effusion with echocardiographic signs of tamponade physiology. RA diastolic collapse.  Normal LV size. Mild global LV systolic dysfunction, LVEF 44%  Normal  RV size. Mild RV systolic dysfunction. Mild pulmonary hypertension, RVSP 44 mmHg.  Normal appearance of bioprosthetic AVR, mean gradient 8 mmHg. No aortic regurgitation.  Prosthetic graft material noted in ascending aorta       Pathology/Cultures: Reviewed  Esophageal mass, biopsy: Invasive moderately differentiated adenocarcinoma.     Medications/Infusions:  Scheduled:   • warfarin  7 mg Oral Once   • metoPROLOL tartrate  25 mg Oral 2 times per day   • WARFARIN - PHYSICIAN MONITORED 1 each  1 each Does not apply Q Evening   • metoCLOPramide  5 mg Oral 4x Daily AC & HS   • furosemide  40 mg Oral Daily   • lidocaine  2 patch Transdermal Daily   • lactulose  20 g Oral Daily   • atorvastatin  20 mg Oral Nightly   • polyethylene glycol  17 g Oral BID   • amiodarone  200 mg Oral Daily   • bisacodyl  10 mg Rectal Daily   • famotidine  20 mg Oral 2 times per day   • guaiFENesin  1,200 mg Oral 2 times per day   • levothyroxine  88 mcg Oral Daily   • sodium chloride (PF)  2 mL Injection 2 times per day   • aspirin  81 mg Oral Daily   • docusate sodium-sennosides  2 tablet Oral Daily       Continuous Infusions:   • heparin (porcine) 22481 units/500 mL dextrose 5% standard infusion 9 Units/kg/hr (03/21/19 1200)   • sodium chloride 0.9% infusion Stopped (03/04/19 1145)   • sodium chloride 0.9% infusion     • sodium chloride 0.9% infusion         Discussed with or notes reviewed:  Patient, RN and MD      Thoracic Surgery  Ivy DUNBAR [phone]   no

## 2019-09-05 PROBLEM — B19.20 UNSPECIFIED VIRAL HEPATITIS C WITHOUT HEPATIC COMA: Chronic | Status: ACTIVE | Noted: 2019-08-30

## 2019-09-09 LAB — SURGICAL PATHOLOGY STUDY: SIGNIFICANT CHANGE UP

## 2019-09-17 ENCOUNTER — APPOINTMENT (OUTPATIENT)
Dept: SURGERY | Facility: CLINIC | Age: 59
End: 2019-09-17
Payer: MEDICAID

## 2019-09-17 PROCEDURE — 99024 POSTOP FOLLOW-UP VISIT: CPT

## 2019-09-17 NOTE — PHYSICAL EXAM
[Normal Breath Sounds] : Normal breath sounds [Normal Rate and Rhythm] : normal rate and rhythm [No Rash or Lesion] : No rash or lesion [Oriented to Person] : oriented to person [Alert] : alert [Oriented to Place] : oriented to place [Calm] : calm [Oriented to Time] : oriented to time [de-identified] : incision site is healing appropriately, no infection, no swelling, no drainage noted; no tenderness  [de-identified] : A/Ox3; NAD. appears comfortable

## 2019-09-17 NOTE — REASON FOR VISIT
[Post Op: _________] : a [unfilled] post op visit [FreeTextEntry1] : DCIS, L. breast, s/p lumpectomy 09/04/19

## 2019-09-17 NOTE — PLAN
[FreeTextEntry1] : Follow up with Dr. Munroe for radiation \par She has an appointment scheduled with oncology, Dr. Barry, next week \par Warning signs, follow up, and restrictions were discussed with the patient.\par F/U in the office in 3 months, or earlier if there are any problems. Call with concerns

## 2019-09-17 NOTE — HISTORY OF PRESENT ILLNESS
[de-identified] : Patient is a 59 y.o F , here for postop visit, she is s/p L. breast needle localization followed by lumpectomy for DCIS, 09/04/19. 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 \par \par Patient states she is feeling well after the surgery, without reported complaints. No pain.

## 2019-09-17 NOTE — CONSULT LETTER
[Dear  ___] : Dear  [unfilled], [Consult Letter:] : I had the pleasure of evaluating your patient, [unfilled]. [Sincerely,] : Sincerely, [Consult Closing:] : Thank you very much for allowing me to participate in the care of this patient.  If you have any questions, please do not hesitate to contact me. [FreeTextEntry3] : Jim Parham MD\par

## 2019-09-17 NOTE — ASSESSMENT
[FreeTextEntry1] : Patient is s/p L. breast needle localization followed by lumpectomy for DCIS, 09/04/19. 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 \par Patient is doing well, with good post-operative recovery. Incision site healing well and as expected. There is no evidence of infection or complication, and patient is progressing as expected.\par \par

## 2019-09-21 NOTE — DATA REVIEWED
Catheter inserted. [FreeTextEntry1] : \par Date of Exam: 05-\par  \par \par  \par  \par   \par   \par   \par \par \par \par  \par EXAM:  DIGITAL BILATERAL SCREENING MAMMOGRAM WITH CAD \par \par HISTORY:  The patient is 59 years old and is seen for screening mammogram. There is no personal history of breast cancer. Family history of breast cancer: Mother, age 40.    \par \par CLINICAL BREAST EXAMINATION:  The patient reports that her last clinical breast exam was within the past year.\par \par TECHNIQUE:  The following views were obtained digitally: bilateral craniocaudal, bilateral mediolateral oblique.     Computer-assisted detection (CAD) was utilized. \par \par COMPARISON:  The present examination has been compared to prior breast imaging studies dating back to 10/22/2012.\par \par FINDINGS:\par BREAST COMPOSITION:  The breasts are heterogeneously dense, which may obscure small masses.\par \par In the anterior third of the left lower inner breast, approximately 2 to 3 cm from the nipple, there are grouped calcifications, increasing in number over time. There are stable diffuse right breast calcifications.    \par \par IMPRESSION: Indeterminate left lower inner breast grouped calcifications, approximately 2 to 3 cm from the nipple, and recommend further evaluation with diagnostic left mammogram.\par \par FOLLOW-UP:  Additional imaging.\par Due to mammographically dense breast parenchyma screening US should be considered in addition to mammography. \par ASSESSMENT:  BI-RADS Category 0:  Incomplete. Need additional imaging evaluation.\par \par The false-negative rate of mammography is approximately 10%. \par \par \par  \par  \par  \par  \par Patient: MIKE BUTCHER\par YOB: 1960\par Phone: [phone]\par MRN: [id number] Acc: [id number]\par Date of Exam: 06-\par  \par \par  \par  \par   \par   \par   \par \par \par \par  \par EXAM:  DIGITAL UNILATERAL LEFT DIAGNOSTIC CALLBACK MAMMOGRAM AND BREAST ULTRASOUND\par \par HISTORY:  The patient is 59 years old and is seen for    . There is no personal history of breast cancer. Family history of breast cancer: None.\par \par COMPARISON:  The present examination has been compared to prior breast imaging studies dating back to 6/13/2016\par \par MAMMOGRAM:\par \par TECHNIQUE:  Full-field digital mammography of the left breast was obtained . Mediolateral view of the left breast was obtained. Spot compression magnification views over the microcalcifications in the left breast were performed in CC, mediolateral projections. CAD was used.      \par \par FINDINGS:\par BREAST COMPOSITION:  The breasts are heterogeneously dense, which may obscure small masses.\par \par A group of microcalcifications are seen at about 6:00 left breast.\par \par ULTRASOUND: Comparison is made with the prior study dated 10/20/2014 \par \par TECHNIQUE:  A bilateral breast ultrasound was performed with complete evaluation of the four quadrants/retroareolar region and axilla.\par \par FINDINGS:  On the right, a cyst is seen at 12:00, 3 cm from the nipple measuring 6 x 3 x 5 mm. A cyst with septation is seen at 8:00, 2 cm from the nipple measuring 7 x 4 x 5 mm. Axillary lymph node with fatty hilum is unremarkable.\par \par On the left, a cyst is seen at 3:00, 2 cm from the nipple measuring 5 x 2 x 5 mm. A cyst is seen at 5:00, 1 cm from the nipple measuring 3 x 2 x 3 mm. A small cyst or dilated ducts is seen in the retroareolar region.\par \par IMPRESSION: \par 1.\par Suspicious microcalcifications at about 6:00 left breast. Stereotactic biopsy is recommended.  \par 2. Benign-appearing findings in bilateral breasts. Six-month follow-up bilateral breast ultrasound is recommended. \par \par FOLLOW-UP:  Biopsy should be considered.\par \par \par ASSESSMENT:  BI-RADS Category 4:  Suspicious.\par \par \par  \par  \par  \par \par \par \par  \par  \par  \par SITE PERFORMED: [facility]\par \par  \par   \par    \par    \par SITE PHONE: [phone] \par \par  \par  \par    \par    \par Patient: MIKE BUTCHER\par YOB: 1960\par Phone: [phone]\lgk MRN: [id number] Acc: [id number]\par Date of Exam: 07-\par  \par \par  \par  \par   \par   \par   \par \par \par \par  \par EXAM:  LEFT BREAST STEREOTACTIC CORE BIOPSY WITH CLIP PLACEMENT, POST PROCEDURE DIGITAL MAMMOGRAM AND SPECIMEN  RADIOGRAPHY\par \par History: Patient is a [age]-year-old female who presents for stereotactic biopsy left 6 o'clock position calcifications.\par \par PROCEDURE:  After informed consent, a  view, followed by 2 orthogonal views was used to stereotactically localize the calcifications. The breast was then prepped in the usual sterile manner and 1% lidocaine was administered.  A 9 gauge needle was inserted into the breast and 2 more views were obtained to confirm position.  Approximately 10 cores were obtained.\par \par Specimen x-ray demonstrated the calcifications.  A clip was then placed at the post biopsy site, as demonstrated on mammography.\par \par Compression was applied for hemostasis.\par \par The patient left the office in good condition.\par \par IMPRESSION:  Stereotactic core biopsy performed of the 6 o'clock position of the left breast. \par \par PATHOLOGY:  Findings were consistent with ductal carcinoma in situ as well as fibrocystic changes. The patient ER/SD positive follow-up: \par \par Follow-up: Surgical consultation is advised. This was discussed with Dr. Scruggs 8/5/2019 at approximately 12:50 PM.\par \par \par \par  \par  \par  \par

## 2019-09-24 ENCOUNTER — APPOINTMENT (OUTPATIENT)
Dept: SURGERY | Facility: CLINIC | Age: 59
End: 2019-09-24
Payer: MEDICAID

## 2019-09-24 PROCEDURE — 99024 POSTOP FOLLOW-UP VISIT: CPT

## 2019-09-24 PROCEDURE — 10021 FNA BX W/O IMG GDN 1ST LES: CPT

## 2019-09-24 NOTE — HISTORY OF PRESENT ILLNESS
[de-identified] : Patient is a 59 y.o F , here for postop visit, she is s/p L. breast needle localization followed by lumpectomy for DCIS, 09/04/19. 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 \par \par Patient had experienced some drainage from the wound site, c/w seroma, which had started earlier today. No pain, or other reported symptoms.

## 2019-09-24 NOTE — PLAN
[FreeTextEntry1] : FNA with LA 1%, she had minimal seroma aspirated, approx 3-4 cc, tolerated procedure well\par no infection, surgical site healing appropriately \par patient will follow up if needed. Warning signs, follow up, and restrictions were discussed with the patient.\par \par presently, she does not want any further excision; plans to follow up with radiation oncologist.

## 2019-09-24 NOTE — PHYSICAL EXAM
[Normal Breath Sounds] : Normal breath sounds [No Rash or Lesion] : No rash or lesion [Normal Rate and Rhythm] : normal rate and rhythm [Alert] : alert [Oriented to Person] : oriented to person [Oriented to Place] : oriented to place [Calm] : calm [Oriented to Time] : oriented to time [de-identified] : A/Ox3; NAD. appears comfortable [de-identified] : FNA with LA 1%, she had some minimal seroma aspirated, 3-4 cc; no tenderness. no infection, no bleeding noted

## 2019-09-24 NOTE — ASSESSMENT
[FreeTextEntry1] : Patient is s/p L. breast needle localization followed by lumpectomy for DCIS, 09/04/19. Path results were c/w - Ductal carcinoma in situ of intermediate nuclear grade with solid pattern, and involving foci of sclerosing and nodular adenosis; tumor is present within 1 mm of the nearest (anterior) margin; fibrocystic changes with florid ductal hyperplasia, micropapillary hyperplasia, columnar cell change and apocrine metaplasia. \par Patient is doing well, with good post-operative recovery. Incision site healing well and as expected. There is no evidence of infection or complication, and patient is progressing as expected.\par She presents with some seroma formation, to the L. breast. No tenderness/infection present. \par \par  \par

## 2019-12-18 NOTE — HISTORY OF PRESENT ILLNESS
[de-identified] : Patient is a 59 y.o F presents for 3 month follow up visit, she is s/p L. breast needle localization followed by lumpectomy for DCIS, 09/04/19. 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 \par

## 2019-12-19 ENCOUNTER — APPOINTMENT (OUTPATIENT)
Dept: SURGERY | Facility: CLINIC | Age: 59
End: 2019-12-19

## 2020-08-04 ENCOUNTER — RESULT REVIEW (OUTPATIENT)
Age: 60
End: 2020-08-04

## 2020-09-22 ENCOUNTER — APPOINTMENT (OUTPATIENT)
Dept: SURGICAL ONCOLOGY | Facility: CLINIC | Age: 60
End: 2020-09-22
Payer: MEDICAID

## 2020-09-22 VITALS
HEIGHT: 65 IN | DIASTOLIC BLOOD PRESSURE: 94 MMHG | HEART RATE: 73 BPM | TEMPERATURE: 97.6 F | WEIGHT: 181 LBS | SYSTOLIC BLOOD PRESSURE: 138 MMHG | BODY MASS INDEX: 30.16 KG/M2

## 2020-09-22 DIAGNOSIS — R92.1 MAMMOGRAPHIC CALCIFICATION FOUND ON DIAGNOSTIC IMAGING OF BREAST: ICD-10-CM

## 2020-09-22 PROCEDURE — 99214 OFFICE O/P EST MOD 30 MIN: CPT

## 2020-09-22 PROCEDURE — 99205 OFFICE O/P NEW HI 60 MIN: CPT

## 2020-09-22 NOTE — CONSULT LETTER
[Dear  ___] : Dear  [unfilled], [Consult Letter:] : I had the pleasure of evaluating your patient, [unfilled]. [Please see my note below.] : Please see my note below. [Consult Closing:] : Thank you very much for allowing me to participate in the care of this patient.  If you have any questions, please do not hesitate to contact me. [Sincerely,] : Sincerely, [FreeTextEntry1] : I will keep you informed of my plans after the stereotactic biopsy. [FreeTextEntry3] : Leonides Agosto MD FACS\par Chief of Surgical Oncology\par \par  [DrOscar  ___] : Dr. JEFFRIES

## 2020-09-22 NOTE — ASSESSMENT
[FreeTextEntry1] : IMP:\par History of DCIS, s/p lumpectomy with Dr. Parham 9/2019\par Newly diagnosed atypical ductal hyperplasia of the right breast 12:00, 1 cmfn\par BIRADS 4 right breast calcifications, pending stereotactic biopsy\par \par PLAN:\par Stereotactic biopsy of right breast calcifications\par Excisional biopsy of right breast atypical ductal hyperplasia\par Pt. will RTO after stereotactic biopsy to schedule the necessary surgery

## 2020-09-22 NOTE — REASON FOR VISIT
[Initial Consultation] : an initial consultation for [FreeTextEntry2] : atypical ductal hyperlasia right breast

## 2020-09-22 NOTE — PHYSICAL EXAM
[Normal] : supple, no neck mass and thyroid not enlarged [Normal Neck Lymph Nodes] : normal neck lymph nodes  [Normal Supraclavicular Lymph Nodes] : normal supraclavicular lymph nodes [Normal Axillary Lymph Nodes] : normal axillary lymph nodes [Normal] : oriented to person, place and time, with appropriate affect [de-identified] : Fibrocystic changes but no masses

## 2020-09-22 NOTE — HISTORY OF PRESENT ILLNESS
[de-identified] : 60 year-old female presents for an initial consultation.  She underwent a core biopsy of the left breast 6:00 position in July 2019, with pathology consistent with DCIS, grade 2 atypia with necrosis, ER/FL+.  On 9/4/19 Dr. Jim Parham performed a left breast lumpectomy.  Final pathology was 13 mm DCIS with negative margins (1 mm anterior margin).  She completed adjuvant XRT. she is now on adjuvant Tamoxifen 20 mg daily.\par \par Bilateral breast imaging was performed on 7/9/20.  Mammogram revealed a group of microcalcifications in the right breast for which diagnostic mammogram was recommended.  Ultrasound revealed a new suspicious nodule in the right breast at 12:00, 1 cmfn, for which an ultrasound-guided biopsy was recommended (BIRADS 4).  \par \par Diagnostic right mammogram performed on 7/31/20 revealed diffuse calcifications in the right breast with a grouping in the upper medial right breast at 7.8 cmfn which may have subtly increased in number.  Stereotactic biopsy is recommended (BIRADS 4). \par \par Ultrasound-guided biopsy of the right breast 12:00 1 cmfn, US-guided biopsy was performed and pathology was consistent with focal atypical ductal hyperplasia\par Stereotactic biopsy of the calcium was never peformed\par \par Referring MD: Dr. Bushra Jackson\par \par Her past medical history includes COPD, hypothyroidism, hyperlipidemia, arthritis, alcoholic cirrhosis, depression, hyperparathyroidism (s/p parathyroidectomy).  Per chart, she has a family history of breast and thyroid cancer involving her mother and colon cancer involving her father.  She no longer smokes. She currently takes Suboxone.

## 2020-10-06 ENCOUNTER — RESULT REVIEW (OUTPATIENT)
Age: 60
End: 2020-10-06

## 2020-10-06 ENCOUNTER — APPOINTMENT (OUTPATIENT)
Dept: MAMMOGRAPHY | Facility: IMAGING CENTER | Age: 60
End: 2020-10-06
Payer: MEDICAID

## 2020-10-06 ENCOUNTER — OUTPATIENT (OUTPATIENT)
Dept: OUTPATIENT SERVICES | Facility: HOSPITAL | Age: 60
LOS: 1 days | End: 2020-10-06
Payer: MEDICAID

## 2020-10-06 DIAGNOSIS — Z98.890 OTHER SPECIFIED POSTPROCEDURAL STATES: Chronic | ICD-10-CM

## 2020-10-06 DIAGNOSIS — Z90.09 ACQUIRED ABSENCE OF OTHER PART OF HEAD AND NECK: Chronic | ICD-10-CM

## 2020-10-06 DIAGNOSIS — Z90.89 ACQUIRED ABSENCE OF OTHER ORGANS: Chronic | ICD-10-CM

## 2020-10-06 DIAGNOSIS — R92.1 MAMMOGRAPHIC CALCIFICATION FOUND ON DIAGNOSTIC IMAGING OF BREAST: ICD-10-CM

## 2020-10-06 DIAGNOSIS — N83.8 OTHER NONINFLAMMATORY DISORDERS OF OVARY, FALLOPIAN TUBE AND BROAD LIGAMENT: Chronic | ICD-10-CM

## 2020-10-06 PROCEDURE — 88305 TISSUE EXAM BY PATHOLOGIST: CPT | Mod: 26

## 2020-10-06 PROCEDURE — A4648: CPT

## 2020-10-06 PROCEDURE — 88305 TISSUE EXAM BY PATHOLOGIST: CPT

## 2020-10-06 PROCEDURE — 77065 DX MAMMO INCL CAD UNI: CPT | Mod: 26,RT

## 2020-10-06 PROCEDURE — 19081 BX BREAST 1ST LESION STRTCTC: CPT | Mod: RT

## 2020-10-06 PROCEDURE — 77065 DX MAMMO INCL CAD UNI: CPT

## 2020-10-06 PROCEDURE — 19081 BX BREAST 1ST LESION STRTCTC: CPT

## 2020-10-20 ENCOUNTER — APPOINTMENT (OUTPATIENT)
Dept: SURGICAL ONCOLOGY | Facility: CLINIC | Age: 60
End: 2020-10-20
Payer: MEDICAID

## 2020-10-20 VITALS
HEIGHT: 65 IN | BODY MASS INDEX: 30.16 KG/M2 | SYSTOLIC BLOOD PRESSURE: 134 MMHG | HEART RATE: 64 BPM | WEIGHT: 181 LBS | OXYGEN SATURATION: 98 % | DIASTOLIC BLOOD PRESSURE: 79 MMHG | TEMPERATURE: 95.3 F

## 2020-10-20 PROCEDURE — 99214 OFFICE O/P EST MOD 30 MIN: CPT

## 2020-10-20 PROCEDURE — 99072 ADDL SUPL MATRL&STAF TM PHE: CPT

## 2020-10-20 NOTE — CONSULT LETTER
[Dear  ___] : Dear  [unfilled], [Courtesy Letter:] : I had the pleasure of seeing your patient, [unfilled], in my office today. [Please see my note below.] : Please see my note below. [Consult Closing:] : Thank you very much for allowing me to participate in the care of this patient.  If you have any questions, please do not hesitate to contact me. [Sincerely,] : Sincerely, [FreeTextEntry1] : I will keep you informed of the final pathology. [FreeTextEntry3] : Leonides Agosto MD FACS\par Chief of Surgical Oncology\par \par  [DrOscar  ___] : Dr. JEFFRIES

## 2020-10-20 NOTE — HISTORY OF PRESENT ILLNESS
[de-identified] : 60 year-old female presents for a follow up visit. \par Initially seen 9/22/2020 for newly diagnosed right breast ADH. \par She underwent a core biopsy of the left breast 6:00 position in July 2019, with pathology consistent with DCIS, grade 2 atypia with necrosis, ER/LA+.  On 9/4/19 Dr. Jim Parham performed a left breast lumpectomy.  Final pathology was 13 mm DCIS with negative margins (1 mm anterior margin).  She completed adjuvant XRT. she is now on adjuvant Tamoxifen 20 mg daily.\par \par Bilateral breast imaging was performed on 7/9/20.  Mammogram revealed a group of microcalcifications in the right breast for which diagnostic mammogram was recommended.  Ultrasound revealed a new suspicious nodule in the right breast at 12:00, 1 cmfn, for which an ultrasound-guided biopsy was recommended (BIRADS 4).  \par \par Diagnostic right mammogram performed on 7/31/20 revealed diffuse calcifications in the right breast with a grouping in the upper medial right breast at 7.8 cmfn which may have subtly increased in number.  Stereotactic biopsy is recommended (BIRADS 4). \par \par Ultrasound-guided biopsy of the right breast 12:00 1 cmfn, US-guided biopsy was performed and pathology was consistent with focal atypical ductal hyperplasia\par \par Right upper inner breast stereotactic core biopsy 10/6/2020 with BENIGN findings (florid usual ductal hyperplasia associated with coarse calcifications). \par \par Referring MD: Dr. Bushra Jackson\par \par Her past medical history includes COPD, hypothyroidism, hyperlipidemia, arthritis, alcoholic cirrhosis, depression, hyperparathyroidism (s/p parathyroidectomy).  Per chart, she has a family history of breast and thyroid cancer involving her mother and colon cancer involving her father.  She no longer smokes. She currently takes Suboxone.

## 2020-10-20 NOTE — ASSESSMENT
[FreeTextEntry1] : IMP:\par History of DCIS, s/p lumpectomy with Dr. Parham 9/2019\par Newly diagnosed atypical ductal hyperplasia of the right breast 12:00, 1 cmfn\par S/p Right upper inner breast stereotactic core biopsy on 10/6/2020 with BENIGN pathology \par \par PLAN:\par Excisional biopsy of right breast atypical ductal hyperplasia under mammo-localization\par

## 2020-10-20 NOTE — PHYSICAL EXAM
[Normal] : supple, no neck mass and thyroid not enlarged [Normal Supraclavicular Lymph Nodes] : normal supraclavicular lymph nodes [Normal Axillary Lymph Nodes] : normal axillary lymph nodes [Normal] : oriented to person, place and time, with appropriate affect [de-identified] : fibrocystic changes but no masses

## 2020-10-25 DIAGNOSIS — Z01.818 ENCOUNTER FOR OTHER PREPROCEDURAL EXAMINATION: ICD-10-CM

## 2020-10-26 ENCOUNTER — APPOINTMENT (OUTPATIENT)
Dept: DISASTER EMERGENCY | Facility: CLINIC | Age: 60
End: 2020-10-26

## 2020-10-27 ENCOUNTER — RESULT REVIEW (OUTPATIENT)
Age: 60
End: 2020-10-27

## 2020-10-27 ENCOUNTER — OUTPATIENT (OUTPATIENT)
Dept: OUTPATIENT SERVICES | Facility: HOSPITAL | Age: 60
LOS: 1 days | End: 2020-10-27
Payer: MEDICAID

## 2020-10-27 VITALS
TEMPERATURE: 98 F | SYSTOLIC BLOOD PRESSURE: 159 MMHG | HEART RATE: 70 BPM | OXYGEN SATURATION: 99 % | DIASTOLIC BLOOD PRESSURE: 87 MMHG | WEIGHT: 179.9 LBS | HEIGHT: 66 IN | RESPIRATION RATE: 18 BRPM

## 2020-10-27 DIAGNOSIS — E03.9 HYPOTHYROIDISM, UNSPECIFIED: ICD-10-CM

## 2020-10-27 DIAGNOSIS — J44.9 CHRONIC OBSTRUCTIVE PULMONARY DISEASE, UNSPECIFIED: ICD-10-CM

## 2020-10-27 DIAGNOSIS — Z90.09 ACQUIRED ABSENCE OF OTHER PART OF HEAD AND NECK: Chronic | ICD-10-CM

## 2020-10-27 DIAGNOSIS — Z98.890 OTHER SPECIFIED POSTPROCEDURAL STATES: Chronic | ICD-10-CM

## 2020-10-27 DIAGNOSIS — N83.8 OTHER NONINFLAMMATORY DISORDERS OF OVARY, FALLOPIAN TUBE AND BROAD LIGAMENT: Chronic | ICD-10-CM

## 2020-10-27 DIAGNOSIS — N60.91 UNSPECIFIED BENIGN MAMMARY DYSPLASIA OF RIGHT BREAST: ICD-10-CM

## 2020-10-27 DIAGNOSIS — Z01.818 ENCOUNTER FOR OTHER PREPROCEDURAL EXAMINATION: ICD-10-CM

## 2020-10-27 DIAGNOSIS — Z90.89 ACQUIRED ABSENCE OF OTHER ORGANS: Chronic | ICD-10-CM

## 2020-10-27 DIAGNOSIS — Z91.89 OTHER SPECIFIED PERSONAL RISK FACTORS, NOT ELSEWHERE CLASSIFIED: ICD-10-CM

## 2020-10-27 LAB
ALBUMIN SERPL ELPH-MCNC: 3.9 G/DL — SIGNIFICANT CHANGE UP (ref 3.5–5)
ALP SERPL-CCNC: 93 U/L — SIGNIFICANT CHANGE UP (ref 40–120)
ALT FLD-CCNC: 28 U/L DA — SIGNIFICANT CHANGE UP (ref 10–60)
ANION GAP SERPL CALC-SCNC: 4 MMOL/L — LOW (ref 5–17)
APTT BLD: 44.9 SEC — HIGH (ref 27.5–35.5)
AST SERPL-CCNC: 21 U/L — SIGNIFICANT CHANGE UP (ref 10–40)
BILIRUB SERPL-MCNC: 0.4 MG/DL — SIGNIFICANT CHANGE UP (ref 0.2–1.2)
BUN SERPL-MCNC: 20 MG/DL — HIGH (ref 7–18)
CALCIUM SERPL-MCNC: 9.2 MG/DL — SIGNIFICANT CHANGE UP (ref 8.4–10.5)
CHLORIDE SERPL-SCNC: 100 MMOL/L — SIGNIFICANT CHANGE UP (ref 96–108)
CO2 SERPL-SCNC: 33 MMOL/L — HIGH (ref 22–31)
CREAT SERPL-MCNC: 0.74 MG/DL — SIGNIFICANT CHANGE UP (ref 0.5–1.3)
GLUCOSE SERPL-MCNC: 134 MG/DL — HIGH (ref 70–99)
HCT VFR BLD CALC: 41 % — SIGNIFICANT CHANGE UP (ref 34.5–45)
HGB BLD-MCNC: 13.3 G/DL — SIGNIFICANT CHANGE UP (ref 11.5–15.5)
INR BLD: 1.11 RATIO — SIGNIFICANT CHANGE UP (ref 0.88–1.16)
MCHC RBC-ENTMCNC: 29.8 PG — SIGNIFICANT CHANGE UP (ref 27–34)
MCHC RBC-ENTMCNC: 32.4 GM/DL — SIGNIFICANT CHANGE UP (ref 32–36)
MCV RBC AUTO: 91.9 FL — SIGNIFICANT CHANGE UP (ref 80–100)
NRBC # BLD: 0 /100 WBCS — SIGNIFICANT CHANGE UP (ref 0–0)
PLATELET # BLD AUTO: 268 K/UL — SIGNIFICANT CHANGE UP (ref 150–400)
POTASSIUM SERPL-MCNC: 4.3 MMOL/L — SIGNIFICANT CHANGE UP (ref 3.5–5.3)
POTASSIUM SERPL-SCNC: 4.3 MMOL/L — SIGNIFICANT CHANGE UP (ref 3.5–5.3)
PROT SERPL-MCNC: 7.6 G/DL — SIGNIFICANT CHANGE UP (ref 6–8.3)
PROTHROM AB SERPL-ACNC: 13.1 SEC — SIGNIFICANT CHANGE UP (ref 10.6–13.6)
RBC # BLD: 4.46 M/UL — SIGNIFICANT CHANGE UP (ref 3.8–5.2)
RBC # FLD: 12 % — SIGNIFICANT CHANGE UP (ref 10.3–14.5)
SARS-COV-2 N GENE NPH QL NAA+PROBE: NOT DETECTED
SODIUM SERPL-SCNC: 137 MMOL/L — SIGNIFICANT CHANGE UP (ref 135–145)
WBC # BLD: 6.81 K/UL — SIGNIFICANT CHANGE UP (ref 3.8–10.5)
WBC # FLD AUTO: 6.81 K/UL — SIGNIFICANT CHANGE UP (ref 3.8–10.5)

## 2020-10-27 PROCEDURE — 71046 X-RAY EXAM CHEST 2 VIEWS: CPT

## 2020-10-27 PROCEDURE — G0463: CPT

## 2020-10-27 PROCEDURE — 36415 COLL VENOUS BLD VENIPUNCTURE: CPT

## 2020-10-27 PROCEDURE — 85730 THROMBOPLASTIN TIME PARTIAL: CPT

## 2020-10-27 PROCEDURE — 80053 COMPREHEN METABOLIC PANEL: CPT

## 2020-10-27 PROCEDURE — 85027 COMPLETE CBC AUTOMATED: CPT

## 2020-10-27 PROCEDURE — 85610 PROTHROMBIN TIME: CPT

## 2020-10-27 PROCEDURE — 71046 X-RAY EXAM CHEST 2 VIEWS: CPT | Mod: 26

## 2020-10-27 RX ORDER — CHOLECALCIFEROL (VITAMIN D3) 125 MCG
1 CAPSULE ORAL
Qty: 0 | Refills: 0 | DISCHARGE

## 2020-10-27 RX ORDER — BUPRENORPHINE AND NALOXONE 2; .5 MG/1; MG/1
2 TABLET SUBLINGUAL
Qty: 0 | Refills: 0 | DISCHARGE

## 2020-10-27 RX ORDER — FLUOXETINE HCL 10 MG
1 CAPSULE ORAL
Qty: 0 | Refills: 0 | DISCHARGE

## 2020-10-27 NOTE — H&P PST ADULT - RS GEN PE MLT RESP DETAILS PC
no rhonchi/normal/breath sounds equal/clear to auscultation bilaterally/good air movement/respirations non-labored/no rales/no chest wall tenderness/no intercostal retractions/no subcutaneous emphysema/no wheezes/airway patent

## 2020-10-27 NOTE — H&P PST ADULT - NEGATIVE RESPIRATORY AND THORAX SYMPTOMS
no pleuritic chest pain/no dyspnea/no cough/no wheezing/no hemoptysis no hemoptysis/no pleuritic chest pain/no dyspnea/no wheezing

## 2020-10-27 NOTE — H&P PST ADULT - HISTORY OF PRESENT ILLNESS
60 year old female with PMH of anxiety, depression, hypothyroidism, opioid dependence off Buprenorphine-naloxone sublingual since 5/2020, COPD-emphysema, hepatitis C treated 10 years ago, left cancer s/p left breast lumpectomy and RT (12/2019) is diagnosed with unspecified benign mammary dysplasia of right breast. She is scheduled for right breast lumpectomy with mammographic localization 10/29/2020

## 2020-10-27 NOTE — H&P PST ADULT - NEGATIVE GENERAL SYMPTOMS
no fever/no chills/no sweating/no anorexia/no weight loss/no polyuria/no malaise/no polyphagia/no polydipsia/no fatigue

## 2020-10-27 NOTE — H&P PST ADULT - NSICDXPROBLEM_GEN_ALL_CORE_FT
PROBLEM DIAGNOSES  Problem: Unspecified benign mammary dysplasia of right breast  Assessment and Plan: Scheduled for right breast lumpectomy with mammographic localization 10/29/2020. Preoperative instructions discussed and given to patient. Covid test completed. Pt verbalized understanding of instructions      Problem: COPD, mild  Assessment and Plan: Mild emphysema. Instructed to use inhalter the morning of surgery and bring medication to hospital 10/29/2020    Problem: Hypothyroidism  Assessment and Plan: Instructed to take thyroid medication as prescribed with a sip of water the morning of surgery         PROBLEM DIAGNOSES  Problem: At risk for injury  Assessment and Plan:   AGE RELATED RISK FACTORS                                                       MOBILITY RELATED FACTORS  [ ] Age 41-60 years                                            (1 Point)                  [ ] Bed rest                                                        (1 Point)  [x ] Age: 61-74 years                                           (2 Points)                 [ ] Plaster cast                                                   (2 Points)  [ ] Age= 75 years                                              (3 Points)                 [ ] Bed bound for more than 72 hours                 (2 Points)  DISEASE RELATED RISK FACTORS                                               GENDER SPECIFIC FACTORS  [ ] Edema in the lower extremities                       (1 Point)                  [ ] Pregnancy                                                     (1 Point)  [ ] Varicose veins                                               (1 Point)                  [ ] Post-partum < 6 weeks                                   (1 Point)             [x ] BMI > 25 Kg/m2                                            (1 Point)                  [ ] Hormonal therapy  or oral contraception          (1 Point)                 [ ] Sepsis (in the previous month)                        (1 Point)                  [ ] History of pregnancy complications                 (1 point)  [ ] Pneumonia or serious lung disease                                               [ ] Unexplained or recurrent                     (1 Point)           (in the previous month)                               (1 Point)  [ ] Abnormal pulmonary function test                     (1 Point)                 SURGERY RELATED RISK FACTORS  [ ] Acute myocardial infarction                              (1 Point)                 [ ]  Section                                             (1 Point)  [ ] Congestive heart failure (in the previous month)  (1 Point)               [ x] Minor surgery                                                  (1 Point)   [ ] Inflammatory bowel disease                             (1 Point)                 [ ] Arthroscopic surgery                                        (2 Points)  [ ] Central venous access                                      (2 Points)                [ ] General surgery lasting more than 45 minutes   (2 Points)       [ ] Stroke (in the previous month)                          (5 Points)               [ ] Elective arthroplasty                                         (5 Points)                                                                                                                                               HEMATOLOGY RELATED FACTORS                                                 TRAUMA RELATED RISK FACTORS  [ ] Prior episodes of VTE                                     (3 Points)                [ ] Fracture of the hip, pelvis, or leg                       (5 Points)  [ ] Positive family history for VTE                         (3 Points)                 [ ] Acute spinal cord injury (in the previous month)  (5 Points)  [ ] Prothrombin 25227 A                                     (3 Points)                 [ ] Paralysis  (less than 1 month)                             (5 Points)  [ ] Factor V Leiden                                             (3 Points)                  [ ] Multiple Trauma within 1 month                        (5 Points)  [ ] Lupus anticoagulants                                     (3 Points)                                                           [ ] Anticardiolipin antibodies                               (3 Points)                                                       [ ] High homocysteine in the blood                      (3 Points)                                             [ ] Other congenital or acquired thrombophilia      (3 Points)                                                [ ] Heparin induced thrombocytopenia                  (3 Points)                                      Total Score [    4      ]  Caprini Score 0 - 2:  Low Risk, No VTE Prophylaxis required for most patients, encourage ambulation  Caprini Score 3 - 6:  At Risk, pharmacologic VTE prophylaxis is indicated for most patients (in the absence of a contraindication)  Caprini Score Greater than or = 7:  High Risk, pharmacologic VTE prophylaxis is indicated for most patients (in the absence of a contraindication)    Problem: Unspecified benign mammary dysplasia of right breast  Assessment and Plan: Scheduled for right breast lumpectomy with mammographic localization 10/29/2020. Preoperative instructions discussed and given to patient. Covid test completed. Pt verbalized understanding of instructions      Problem: COPD, mild  Assessment and Plan: Mild emphysema. Instructed to use inhalter the morning of surgery and bring medication to hospital 10/29/2020    Problem: Hypothyroidism  Assessment and Plan: Instructed to take thyroid medication as prescribed with a sip of water the morning of surgery

## 2020-10-27 NOTE — H&P PST ADULT - NSICDXPASTMEDICALHX_GEN_ALL_CORE_FT
PAST MEDICAL HISTORY:  Anxiety and depression     Back pain     Chronic obstructive pulmonary disease (COPD)     Hepatitis C treated with Interferon 10 years ago    Hyperlipidemia     Hyperparathyroidism     Hypothyroidism     Intraductal carcinoma of left breast s/p lumpectomy then RT    Opioid abuse Oxycodone  used [id number], taking suboxone for 5 years    Osteoarthritis     Osteopenia

## 2020-10-27 NOTE — H&P PST ADULT - NEGATIVE CARDIOVASCULAR SYMPTOMS
no orthopnea/no paroxysmal nocturnal dyspnea/no peripheral edema/no claudication/no palpitations/no dyspnea on exertion/no chest pain

## 2020-10-27 NOTE — H&P PST ADULT - NSICDXPASTSURGICALHX_GEN_ALL_CORE_FT
PAST SURGICAL HISTORY:  H/O left breast biopsy stereotactic on 7/29/2019    H/O lumpectomy left breast 2019    History of parathyroidectomy right inferior on 5/14/2018    Rupture, fallopian tube left, 1983    S/P tonsillectomy 1960s

## 2020-10-27 NOTE — H&P PST ADULT - ASSESSMENT
60 year old female with PMH of anxiety, depression, hypothyroidism, opioid dependence off Buprenorphine-naloxone sublingual since 5/2020, COPD-emphysema, hepatitis C treated 10 years ago, left cancer s/p left breast lumpectomy and RT (12/2019) is now diagnosed with unspecified benign mammary dysplasia of right breast 60 year old female with PMH of anxiety, depression, hypothyroidism, opioid dependence off Buprenorphine-naloxone sublingual since 5/2020, COPD-emphysema, hepatitis C treated 10 years ago, left cancer s/p left breast lumpectomy and RT (12/2019) is now diagnosed with unspecified benign mammary dysplasia of right breast

## 2020-10-27 NOTE — H&P PST ADULT - NSICDXFAMILYHX_GEN_ALL_CORE_FT
FAMILY HISTORY:  Family history of brain cancer, grandmother  Family history of lung cancer, maternal grandmother  Family history of rectal cancer, father  Family history of skin cancer, father    Father  Still living? No  Family history of colon cancer, Age at diagnosis: Age Unknown  Family history of diabetes mellitus, Age at diagnosis: Age Unknown    Mother  Still living? Yes, Estimated age: Age Unknown  Family history of breast cancer, Age at diagnosis: Age Unknown  Family history of thyroid cancer, Age at diagnosis: Age Unknown    Sibling  Still living? Yes, Estimated age: Age Unknown  Family history of thyroid cancer, Age at diagnosis: Age Unknown

## 2020-10-27 NOTE — H&P PST ADULT - VENOUS THROMBOEMBOLISM CURRENT STATUS
(1) other risk factor (includes escalating BMI, pack-years of smoking, diabetes requiring insulin, chemotherapy, female gender and length of surgery)/(1) abnormal pulmonary function (COPD)/(2) malignancy (present or previous)

## 2020-10-28 ENCOUNTER — TRANSCRIPTION ENCOUNTER (OUTPATIENT)
Age: 60
End: 2020-10-28

## 2020-10-29 ENCOUNTER — APPOINTMENT (OUTPATIENT)
Dept: SURGICAL ONCOLOGY | Facility: HOSPITAL | Age: 60
End: 2020-10-29

## 2020-10-29 ENCOUNTER — RESULT REVIEW (OUTPATIENT)
Age: 60
End: 2020-10-29

## 2020-10-29 ENCOUNTER — OUTPATIENT (OUTPATIENT)
Dept: OUTPATIENT SERVICES | Facility: HOSPITAL | Age: 60
LOS: 1 days | End: 2020-10-29
Payer: MEDICAID

## 2020-10-29 VITALS
RESPIRATION RATE: 18 BRPM | DIASTOLIC BLOOD PRESSURE: 82 MMHG | OXYGEN SATURATION: 94 % | SYSTOLIC BLOOD PRESSURE: 125 MMHG | HEART RATE: 69 BPM | TEMPERATURE: 98 F

## 2020-10-29 VITALS
RESPIRATION RATE: 16 BRPM | TEMPERATURE: 98 F | WEIGHT: 179.9 LBS | HEIGHT: 66 IN | DIASTOLIC BLOOD PRESSURE: 81 MMHG | SYSTOLIC BLOOD PRESSURE: 149 MMHG | OXYGEN SATURATION: 99 % | HEART RATE: 72 BPM

## 2020-10-29 DIAGNOSIS — Z90.09 ACQUIRED ABSENCE OF OTHER PART OF HEAD AND NECK: Chronic | ICD-10-CM

## 2020-10-29 DIAGNOSIS — Z98.890 OTHER SPECIFIED POSTPROCEDURAL STATES: Chronic | ICD-10-CM

## 2020-10-29 DIAGNOSIS — N83.8 OTHER NONINFLAMMATORY DISORDERS OF OVARY, FALLOPIAN TUBE AND BROAD LIGAMENT: Chronic | ICD-10-CM

## 2020-10-29 DIAGNOSIS — N60.91 UNSPECIFIED BENIGN MAMMARY DYSPLASIA OF RIGHT BREAST: ICD-10-CM

## 2020-10-29 DIAGNOSIS — Z01.818 ENCOUNTER FOR OTHER PREPROCEDURAL EXAMINATION: ICD-10-CM

## 2020-10-29 DIAGNOSIS — Z90.89 ACQUIRED ABSENCE OF OTHER ORGANS: Chronic | ICD-10-CM

## 2020-10-29 PROCEDURE — 19301 PARTIAL MASTECTOMY: CPT | Mod: RT

## 2020-10-29 PROCEDURE — 88307 TISSUE EXAM BY PATHOLOGIST: CPT | Mod: 26

## 2020-10-29 PROCEDURE — 88305 TISSUE EXAM BY PATHOLOGIST: CPT | Mod: 26

## 2020-10-29 PROCEDURE — 88307 TISSUE EXAM BY PATHOLOGIST: CPT

## 2020-10-29 PROCEDURE — 19281 PERQ DEVICE BREAST 1ST IMAG: CPT | Mod: RT

## 2020-10-29 PROCEDURE — 19281 PERQ DEVICE BREAST 1ST IMAG: CPT

## 2020-10-29 PROCEDURE — 76098 X-RAY EXAM SURGICAL SPECIMEN: CPT

## 2020-10-29 PROCEDURE — 76098 X-RAY EXAM SURGICAL SPECIMEN: CPT | Mod: 26

## 2020-10-29 PROCEDURE — 19366: CPT | Mod: RT,59

## 2020-10-29 PROCEDURE — 88305 TISSUE EXAM BY PATHOLOGIST: CPT

## 2020-10-29 RX ORDER — OMEGA-3 ACID ETHYL ESTERS 1 G
1 CAPSULE ORAL
Qty: 0 | Refills: 0 | DISCHARGE

## 2020-10-29 RX ORDER — LEVOTHYROXINE SODIUM 125 MCG
1 TABLET ORAL
Qty: 0 | Refills: 0 | DISCHARGE

## 2020-10-29 RX ORDER — IBUPROFEN 200 MG
1 TABLET ORAL
Qty: 0 | Refills: 0 | DISCHARGE

## 2020-10-29 RX ORDER — HYDROMORPHONE HYDROCHLORIDE 2 MG/ML
0.5 INJECTION INTRAMUSCULAR; INTRAVENOUS; SUBCUTANEOUS
Refills: 0 | Status: DISCONTINUED | OUTPATIENT
Start: 2020-10-29 | End: 2020-10-29

## 2020-10-29 RX ORDER — OXYCODONE AND ACETAMINOPHEN 5; 325 MG/1; MG/1
1 TABLET ORAL EVERY 6 HOURS
Refills: 0 | Status: DISCONTINUED | OUTPATIENT
Start: 2020-10-29 | End: 2020-10-29

## 2020-10-29 RX ORDER — ONDANSETRON 8 MG/1
4 TABLET, FILM COATED ORAL ONCE
Refills: 0 | Status: DISCONTINUED | OUTPATIENT
Start: 2020-10-29 | End: 2020-10-29

## 2020-10-29 RX ORDER — SODIUM CHLORIDE 9 MG/ML
3 INJECTION INTRAMUSCULAR; INTRAVENOUS; SUBCUTANEOUS EVERY 8 HOURS
Refills: 0 | Status: DISCONTINUED | OUTPATIENT
Start: 2020-10-29 | End: 2020-10-29

## 2020-10-29 RX ORDER — CALCIUM CARBONATE 500(1250)
2 TABLET ORAL
Qty: 0 | Refills: 0 | DISCHARGE

## 2020-10-29 RX ORDER — UBIDECARENONE 100 MG
1 CAPSULE ORAL
Qty: 0 | Refills: 0 | DISCHARGE

## 2020-10-29 RX ORDER — ALBUTEROL 90 UG/1
2 AEROSOL, METERED ORAL
Qty: 0 | Refills: 0 | DISCHARGE

## 2020-10-29 RX ADMIN — SODIUM CHLORIDE 3 MILLILITER(S): 9 INJECTION INTRAMUSCULAR; INTRAVENOUS; SUBCUTANEOUS at 10:17

## 2020-10-29 NOTE — BRIEF OPERATIVE NOTE - NSICDXBRIEFPREOP_GEN_ALL_CORE_FT
PRE-OP DIAGNOSIS:  Atypical ductal hyperplasia of right breast 29-Oct-2020 13:13:03  Funmilayo Hernández

## 2020-10-29 NOTE — ASU DISCHARGE PLAN (ADULT/PEDIATRIC) - CALL YOUR DOCTOR IF YOU HAVE ANY OF THE FOLLOWING:
Fever greater than (need to indicate Fahrenheit or Celsius)/Swelling that gets worse/Wound/Surgical Site with redness, or foul smelling discharge or pus

## 2020-10-29 NOTE — ASU DISCHARGE PLAN (ADULT/PEDIATRIC) - NURSING INSTRUCTIONS
Keep dressing dry, clean, intact for 1 day. Do not get incision wet for 24 hours (1 day). After 1 day, remove dressing and leave steri strips (white tapes) on. You can shower with steri strips on. The steri strips will fall off during shower. Do not rub them off. It sometimes take 2-3 days for them to fall off.

## 2020-10-29 NOTE — ASU PATIENT PROFILE, ADULT - PMH
Anxiety and depression    Back pain    Chronic obstructive pulmonary disease (COPD)    Hepatitis C  treated with Interferon 10 years ago  Hyperlipidemia    Hyperparathyroidism    Hypothyroidism    Intraductal carcinoma of left breast  s/p lumpectomy then RT  Opioid abuse  Oxycodone  used [id number], taking suboxone for 5 years  Osteoarthritis    Osteopenia

## 2020-10-29 NOTE — BRIEF OPERATIVE NOTE - NSICDXBRIEFPOSTOP_GEN_ALL_CORE_FT
POST-OP DIAGNOSIS:  Atypical ductal hyperplasia of right breast 29-Oct-2020 13:13:16  Funmilayo Hernández

## 2020-10-29 NOTE — BRIEF OPERATIVE NOTE - NSICDXBRIEFPROCEDURE_GEN_ALL_CORE_FT
PROCEDURES:  Lumpectomy or partial mastectomy, after needle localization 29-Oct-2020 13:12:42  Funmilayo Hernández  Right breast lumpectomy 29-Oct-2020 13:12:08  Funmilayo Hernández

## 2020-10-29 NOTE — ASU PATIENT PROFILE, ADULT - PSH
H/O left breast biopsy  stereotactic on 7/29/2019  H/O lumpectomy  left breast 2019  History of parathyroidectomy  right inferior on 5/14/2018  Rupture, fallopian tube  left, 1983  S/P tonsillectomy  1960s

## 2020-10-29 NOTE — ASU DISCHARGE PLAN (ADULT/PEDIATRIC) - CARE PROVIDER_API CALL
Leonides Agosto  SURGERY  [address] [place] [address]  Phone: [phone]  Fax: [phone]  Follow Up Time: 1 week

## 2020-10-30 PROBLEM — J44.9 CHRONIC OBSTRUCTIVE PULMONARY DISEASE, UNSPECIFIED: Chronic | Status: ACTIVE | Noted: 2019-08-30

## 2020-10-30 PROBLEM — D05.12 INTRADUCTAL CARCINOMA IN SITU OF LEFT BREAST: Chronic | Status: ACTIVE | Noted: 2019-08-30

## 2020-11-02 LAB — SURGICAL PATHOLOGY STUDY: SIGNIFICANT CHANGE UP

## 2020-11-10 ENCOUNTER — APPOINTMENT (OUTPATIENT)
Dept: SURGICAL ONCOLOGY | Facility: CLINIC | Age: 60
End: 2020-11-10
Payer: MEDICAID

## 2020-11-10 VITALS
HEART RATE: 80 BPM | SYSTOLIC BLOOD PRESSURE: 124 MMHG | WEIGHT: 180 LBS | DIASTOLIC BLOOD PRESSURE: 81 MMHG | BODY MASS INDEX: 29.99 KG/M2 | HEIGHT: 65 IN

## 2020-11-10 DIAGNOSIS — N60.91 UNSPECIFIED BENIGN MAMMARY DYSPLASIA OF RIGHT BREAST: ICD-10-CM

## 2020-11-10 PROCEDURE — 99024 POSTOP FOLLOW-UP VISIT: CPT

## 2020-11-10 NOTE — CONSULT LETTER
[Dear  ___] : Dear  [unfilled], [Courtesy Letter:] : I had the pleasure of seeing your patient, [unfilled], in my office today. [Please see my note below.] : Please see my note below. [Consult Closing:] : Thank you very much for allowing me to participate in the care of this patient.  If you have any questions, please do not hesitate to contact me. [Sincerely,] : Sincerely, [FreeTextEntry1] : She will be contactin you to discus chemo-prevention. [FreeTextEntry3] : Leonides Agosto MD FACS\par Chief of Surgical Oncology\par \par  [DrOscar  ___] : Dr. JEFFRIES

## 2020-11-10 NOTE — PHYSICAL EXAM
[Normal] : supple, no neck mass and thyroid not enlarged [Normal Supraclavicular Lymph Nodes] : normal supraclavicular lymph nodes [Normal Axillary Lymph Nodes] : normal axillary lymph nodes [Normal] : oriented to person, place and time, with appropriate affect [de-identified] : right breast circumareolar incision healing nicely

## 2020-11-10 NOTE — ASSESSMENT
[FreeTextEntry1] : IMP:\par History of DCIS, s/p lumpectomy with Dr. Parham 9/2019\par Newly diagnosed atypical ductal hyperplasia of the right breast 12:00, 1 cm fn\par S/p Right upper inner breast stereotactic core biopsy on 10/6/2020 with BENIGN pathology \par -On 10/29/2020, S/P Right breast localized lumpectomy. Pathology Final Diagnosis \par - Lobular neoplasia including atypical lobular hyperplasia and lobular carcinoma in situ with focal extension into the ducts - Focal atypical ductal epithelial hyperplasia.\par \par PLAN:\par Pt. to see Dr. Jackson about restarting chemo-prevention with tamoxifen or arimidex\par RTO 3 months\par Right mammogram 6 months\par \par

## 2020-11-10 NOTE — HISTORY OF PRESENT ILLNESS
[de-identified] : 60 year-old female presents for an initial post op visit, s/p Right breast localized lumpectomy on 10/29/2020.\par Final pathology: \par - Focal atypical ductal epithelial hyperplasia.\par - Lobular neoplasia including atypical lobular hyperplasia and lobular carcinoma in situ with focal extension into the ducts.\par - Fibrocystic change harboring microcalcifications and including florid, micropapillary and papillary ductal epithelial hyperplasia, apocrine metaplasia, sclerosing and blunt duct adenosis, nodular adenosis, stromal fibrosis and cysts.\par All Right breast margin were Benign breast tissue with fibrocystic change, proliferative type. Except for Right breast margin, inferior; excision which had Focal lobular neoplasia including atypical lobular hyperplasia and lobular carcinoma in situ and Benign breast tissue with fibrocystic change, proliferative type.\par \par Denies pain, fever or chills. \par PERTINENT HISTORY:\par Initially seen 9/22/2020 for newly diagnosed right breast ADH. \par She underwent a core biopsy of the left breast 6:00 position in July 2019, with pathology consistent with DCIS, grade 2 atypia with necrosis, ER/ME+.  On 9/4/19 Dr. Jim Parham performed a left breast lumpectomy.  Final pathology was 13 mm DCIS with negative margins (1 mm anterior margin).  She completed adjuvant XRT. she is now on adjuvant Tamoxifen 20 mg daily.\par \par Bilateral breast imaging was performed on 7/9/2020.  Mammogram revealed a group of microcalcifications in the right breast for which diagnostic mammogram was recommended.  Ultrasound revealed a new suspicious nodule in the right breast at 12:00, 1 cm fn, for which an ultrasound-guided biopsy was recommended (BI-RADS 4).  \par \par Diagnostic right mammogram performed on 7/31/20 revealed diffuse calcifications in the right breast with a grouping in the upper medial right breast at 7.8 cm fn which may have subtly increased in number.  Stereotactic biopsy is recommended (BI-RADS 4). \par \par Ultrasound-guided biopsy of the right breast 12:00 1 cm fn, US-guided biopsy was performed and pathology was consistent with focal atypical ductal hyperplasia\par \par Right upper inner breast stereotactic core biopsy 10/6/2020 with BENIGN findings (florid usual ductal hyperplasia associated with coarse calcifications). \par \par Denies palpable breast masses, nipple discharge, skin changes, inversion or breast pain. Denies constitutional symptoms \par \par Referring MD: Dr. Bushra Jackson\par \par Her past medical history includes COPD, hypothyroidism, hyperlipidemia, arthritis, alcoholic cirrhosis, depression, hyperparathyroidism (s/p parathyroidectomy).  Per chart, she has a family history of breast and thyroid cancer involving her mother and colon cancer involving her father.  She no longer smokes. She currently takes Suboxone.

## 2023-06-22 NOTE — ASU PREOP CHECKLIST - ADVANCE DIRECTIVE ADDRESSED/READDRESSED
RE: Plan of Care    Donald Chau MD    Thank you for referring Dary Jc. The following information reflects my assessment and plan of care.           Plan of Care 23   Effective from: 2023  Effective to: 2023    Plan ID: [id number]            Participants as of 2023    Name Type Comments Contact Info    Donald Chau MD Referring Provider  [phone]    Logan Butler, PT Physical Therapist             Dary Jc MRN:[id number] (:1972 51 year old F)             Evaluation     Author: Logan Butler, PT Status: Signed Last edited: 2023  9:15 AM       Physical Therapy Evaluation    Visit Type: Initial Evaluation  Visit: 1  Referring Provider: Donald Chau MD  Medical Diagnosis (from order): Diagnosis Information    Diagnosis  717.7 (ICD-9-CM) - M94.262 (ICD-10-CM) - Chondromalacia of left knee  717.7 (ICD-9-CM) - M94.261 (ICD-10-CM) - Chondromalacia of right knee  845.00 (ICD-9-CM) - S93.402A (ICD-10-CM) - Sprain of left ankle       Treatment Diagnosis: LLE, left ankle - increased pain/symptoms, impaired strength, impaired gait, increased risk for falls, impaired range of motion, increased swelling, impaired muscle length/flexibility, impaired balance and impaired activity tolerance.  Onset  - Date of onset: 6/15/2023  Patient alert and oriented X3.  Chart reviewed at time of initial evaluation (relevant co-morbidities, allergies, tests and medications listed):   Past Medical History:  No date: Abdominal pain      Comment:  for 2 months  No date: Anemia  No date: Anxiety  No date: AS (ankylosing spondylitis) (CMD)  No date: Depression  9/10/2020: Essential thrombocytosis (CMD)  No date: Gastroesophageal reflux disease  No date: Liver disease    FROM PHYSICIAN NOTE:  Patient returns for follow-up patient previously seen here for some chondromalacia of both knees with no meniscal pathology.  She had cortisone shots in both knees went through therapy doing  some of the exercises has improved but still having some discomfort.  Recently of less than a week ago she sprained her left ankle causing some swelling laterally which is affecting her gait and aggravating her knees  Previous Surgeries     Objective   Left ankle shows some swelling over the anterior talar fib no medial or lateral malleolar pain no tibiotalar joint line pain mildly positive drawer test and plantarflexion negative in dorsiflexion no syndesmosis pain     Knee Musculoskeletal Exam  Both knees has some mild patellofemoral medial joint line pain with no effusion motion 125 with Vignesh's negative no ligamentous instability in either knee     Shoulder Musculoskeletal Exam     Image Results:     No image results found.     Assessment  Chondromalacia of the right left knee with a left lateral ankle sprain     Plan  She is to continue taking anti-inflammatories ice on a regular basis in all her joints and have her go through a formal therapy for both knees and her ankle again have her follow-up again with me as needed      SUBJECTIVE                                                                                                               INITIAL EVALUATION 6/22/23: Dary presents with son, whom translate for her. Pt had sprained her L ankle and since then had increased problems with both knees and her L hip. Pt presents with ace wrap on ankle.     Pain / Symptoms  - Pain rating (out of 10): Current: 5   - Location: L lateral ankle/ L groin/ numbness in anterior thigh  - Quality / Description: ache, numbness  - Alleviating Factors: avoiding movement in involved area, supportive device, rest    Function:   Limitations / Exacerbation Factors:   - Patient reports difficulty and pain with function reported below.  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 no limitation in involved extremity,    Patient Goals: independence with ADLs/IADLs, improved balance, decreased pain, return to sport/leisure activities, increased strength and increased motion.    Prior treatment  - no therapies  - Discharged from hospital, home health, or skilled nursing facility in last 30 days: no  Home Environment   - Assistance available: as needed  - Denies 2 or more falls or an unexplained fall with injury in the last year.  - Feel safe at home / work / school: yes      OBJECTIVE                                                                                                                     Range of Motion (ROM)   (degrees unless noted; active unless noted; norms in ( ); negative=lacking to 0, positive=beyond 0)  Knee:   - Flexion (150):      • Left:  130       • Right:  135    - Extension (0-10):      • Left:  Ext (0-10) L AROM Degree: 2 hyperext.      • Right:  Ext (0-10) R AROM Degree: 3 hyperext.  Ankle:   - Dorsiflexion (20):      • Left:  10  Pain      • Right:  10  Pain    - Plantar Flexion (45-50):      • Left:  30  Pain      • Right:  40  Pain   - Inversion (30-35):     • Left: pain 30     • Right: pain 30   - Eversion (15-20):     • Left: pain 10     • Right: pain 15  Lumbar:    - Flexion (60-80):  100%  pain     - Extension (25):  100%     - Rotation (30-45):        • Left:  100%  pain         • Right:  100%  pain     - Side Bend (25-35):        • Left:  100%         • Right:  100%                Ambulation / Gait  - Assistive device: no assistive device  - Distance (feet unless otherwise indicated): 30  - Assist Level: independent  - Surface: even  - Description: antalgic  Pt has difficulty and pain with walking on heels.             Treatment     Therapeutic Exercise  *= added to HEP  TB = Theraband  DB = Dumbbell  PERFORMED:  Review relevant anatomy  Review HEP    NOT  PERFORMED:    Manual Therapy   Myofascial Release = MFR  Soft tissue massage = STM    PERFORMED:    NOT PERFORMED:      Skilled input: verbal instruction/cues    Writer verbally educated and received verbal consent for hand placement, positioning of patient, and techniques to be performed today from patient for clothing adjustments for techniques, therapist position for techniques and hand placement and palpation for techniques as described above and how they are pertinent to the patient's plan of care.  Home Exercise Program  Access Code: [email or web address]  URL: [email or web address]/  Date: 06/22/2023  Prepared by: Logan Butler    Exercises  - Isometric Ankle Dorsiflexion and Plantarflexion  - 2-3 x daily - 7 x weekly - 10 reps - 5 hold  - Isometric Ankle Inversion at Wall  - 2-3 x daily - 7 x weekly - 10 reps - 5 hold  - Isometric Ankle Eversion at Wall  - 2-3 x daily - 7 x weekly - 10 reps - 5 hold      ASSESSMENT                                                                                                          51 year old patient has reported functional limitations listed above impacted by signs and symptoms consistent with treatment diagnosis below.  Treatment Diagnosis:   - Involved: LLE, left ankle.  - Symptoms/impairments: increased pain/symptoms, impaired strength, impaired gait, increased risk for falls, impaired range of motion, increased swelling, impaired muscle length/flexibility, impaired balance and impaired activity tolerance.    INITIAL EVALUATION 6/22/23: Dary presents to the clinic after sustaining a left ankle sprain over a week ago and increased hip and knee problems since then. Pt exhibits increased ankle pain and swelling, loss of mobility, muscle weakness, and gait deviations all limiting pt's ability to stand prolonged periods, perform house chores, and spend time outdoors. The patient will benefit from skilled PT intervention with focus on patient education,  therapeutic exercise, and manual therapy techniques to address limitations. Failure to intervene may lead to permanent function loss and could increase chronicity of this disorder, impeding upon pt's overall function and quality of life.   Pain/symptoms after session (out of 10): 5    Prognosis: Patient will benefit from skilled therapy.  Rehabilitative potential is: good.  Predicted patient presentation: Low (stable) - Patient comorbidities and complexities, as defined above, will have little effect on progress for prescribed plan of care.  Education:   - Present and ready to learn: patient and patient's family  - Results of above outlined education: Demonstrates understanding and Verbalizes understanding    PLAN                                                                                                                         The following skilled interventions to be implemented to achieve goals listed below:  Activities of Daily Living/Self Care (17476)  Neuromuscular Re-Education (28652)  Therapeutic Exercise (95297)  Electrical Stimulation Unattended (73251 or )  Manual Therapy (21749)  Therapeutic Activity (62386)  Electrical Stimulation Attended (54238)  Ultrasound/Phonophoresis (77612)  Gait Training (33013)    Frequency / Duration  1 times per week tapering as patient progresses for 8 weeks for an estimated total of 6 visits    Patient involved in and agreed to plan of care and goals.  Patient given attendance policy at time of initial evaluation.    Suggestions for next session as indicated: Progress per plan of care. Assess knees and hips.      Goals  Long Term Goals: to be met by end of plan of care  1. Pt to amb on even surfaces for community distances prn daily without pain limiting her.  2. Pt to stand for greater than 20 minutes at a time daily without pain.  3. Pt to report 0 episodes of ankle collapsing in 2 week period.  4. Pt to amb on uneven surfaces prn daily without pain limiting her.        Therapy procedure time and total treatment time can be found documented on the Time Entry flowsheet           Current Participants as of 6/22/2023    Name Type Comments Contact Info    Donald Chau MD Referring Provider  [phone]    Signature pending    Logan Butler PT Physical Therapist      Signature pending            Please complete the attached form to indicate your approval of the plan of care upon receipt and fax signed form to the fax number below.  Insurance compliance requires your approval be on file.  Should you have any questions, feel free to contact me.     Logan Butler PT  [address]  [id number]  [facility] [address]  Phone: [phone]  Fax: [phone]                RE: Plan of Care for Dary Jc, YOB: 1972     I certify the need for these services, furnished under this plan of treatment and while under my care.  I agree with the plan of care as stated and request that therapy proceed.        __________________________________________________________________________________  Provider Signature         Date   Time done